# Patient Record
Sex: MALE | Race: BLACK OR AFRICAN AMERICAN | NOT HISPANIC OR LATINO | Employment: OTHER | URBAN - METROPOLITAN AREA
[De-identification: names, ages, dates, MRNs, and addresses within clinical notes are randomized per-mention and may not be internally consistent; named-entity substitution may affect disease eponyms.]

---

## 2021-06-16 ENCOUNTER — HOSPITAL ENCOUNTER (INPATIENT)
Facility: HOSPITAL | Age: 76
LOS: 2 days | Discharge: HOME | End: 2021-06-19
Attending: EMERGENCY MEDICINE | Admitting: HOSPITALIST

## 2021-06-16 ENCOUNTER — APPOINTMENT (OUTPATIENT)
Dept: GENERAL RADIOLOGY | Facility: HOSPITAL | Age: 76
End: 2021-06-16

## 2021-06-16 DIAGNOSIS — I16.0 HYPERTENSIVE URGENCY: Primary | ICD-10-CM

## 2021-06-16 DIAGNOSIS — N17.9 AKI (ACUTE KIDNEY INJURY) (HCC): ICD-10-CM

## 2021-06-16 DIAGNOSIS — R51.9 NONINTRACTABLE EPISODIC HEADACHE, UNSPECIFIED HEADACHE TYPE: ICD-10-CM

## 2021-06-16 PROCEDURE — 84484 ASSAY OF TROPONIN QUANT: CPT | Performed by: PHYSICIAN ASSISTANT

## 2021-06-16 PROCEDURE — 99285 EMERGENCY DEPT VISIT HI MDM: CPT

## 2021-06-16 PROCEDURE — 85025 COMPLETE CBC W/AUTO DIFF WBC: CPT | Performed by: PHYSICIAN ASSISTANT

## 2021-06-16 PROCEDURE — 71045 X-RAY EXAM CHEST 1 VIEW: CPT

## 2021-06-16 PROCEDURE — 80053 COMPREHEN METABOLIC PANEL: CPT | Performed by: PHYSICIAN ASSISTANT

## 2021-06-16 PROCEDURE — 83880 ASSAY OF NATRIURETIC PEPTIDE: CPT | Performed by: PHYSICIAN ASSISTANT

## 2021-06-16 RX ORDER — HYDRALAZINE HYDROCHLORIDE 20 MG/ML
10 INJECTION INTRAMUSCULAR; INTRAVENOUS ONCE
Status: COMPLETED | OUTPATIENT
Start: 2021-06-16 | End: 2021-06-17

## 2021-06-16 RX ORDER — SODIUM CHLORIDE 0.9 % (FLUSH) 0.9 %
10 SYRINGE (ML) INJECTION AS NEEDED
Status: DISCONTINUED | OUTPATIENT
Start: 2021-06-16 | End: 2021-06-19 | Stop reason: HOSPADM

## 2021-06-17 ENCOUNTER — APPOINTMENT (OUTPATIENT)
Dept: CT IMAGING | Facility: HOSPITAL | Age: 76
End: 2021-06-17

## 2021-06-17 PROBLEM — R73.9 HYPERGLYCEMIA: Status: ACTIVE | Noted: 2021-06-17

## 2021-06-17 PROBLEM — R79.89 ELEVATED SERUM CREATININE: Status: ACTIVE | Noted: 2021-06-17

## 2021-06-17 PROBLEM — I16.0 HYPERTENSIVE URGENCY: Status: ACTIVE | Noted: 2021-06-17

## 2021-06-17 LAB
ALBUMIN SERPL-MCNC: 4.5 G/DL (ref 3.5–5.2)
ALBUMIN/GLOB SERPL: 1.5 G/DL
ALP SERPL-CCNC: 58 U/L (ref 39–117)
ALT SERPL W P-5'-P-CCNC: 28 U/L (ref 1–41)
AMPHET+METHAMPHET UR QL: NEGATIVE
AMPHETAMINES UR QL: NEGATIVE
ANION GAP SERPL CALCULATED.3IONS-SCNC: 10 MMOL/L (ref 5–15)
ANION GAP SERPL CALCULATED.3IONS-SCNC: 10 MMOL/L (ref 5–15)
AST SERPL-CCNC: 58 U/L (ref 1–40)
BARBITURATES UR QL SCN: NEGATIVE
BASOPHILS # BLD AUTO: 0.05 10*3/MM3 (ref 0–0.2)
BASOPHILS NFR BLD AUTO: 0.9 % (ref 0–1.5)
BENZODIAZ UR QL SCN: NEGATIVE
BILIRUB SERPL-MCNC: 0.6 MG/DL (ref 0–1.2)
BUN SERPL-MCNC: 19 MG/DL (ref 8–23)
BUN SERPL-MCNC: 22 MG/DL (ref 8–23)
BUN/CREAT SERPL: 14.9 (ref 7–25)
BUN/CREAT SERPL: 15.7 (ref 7–25)
BUPRENORPHINE SERPL-MCNC: NEGATIVE NG/ML
CALCIUM SPEC-SCNC: 10.4 MG/DL (ref 8.6–10.5)
CALCIUM SPEC-SCNC: 9.8 MG/DL (ref 8.6–10.5)
CANNABINOIDS SERPL QL: NEGATIVE
CHLORIDE SERPL-SCNC: 100 MMOL/L (ref 98–107)
CHLORIDE SERPL-SCNC: 101 MMOL/L (ref 98–107)
CO2 SERPL-SCNC: 26 MMOL/L (ref 22–29)
CO2 SERPL-SCNC: 29 MMOL/L (ref 22–29)
COCAINE UR QL: NEGATIVE
CREAT SERPL-MCNC: 1.21 MG/DL (ref 0.76–1.27)
CREAT SERPL-MCNC: 1.48 MG/DL (ref 0.76–1.27)
CREAT UR-MCNC: 142.3 MG/DL
DEPRECATED RDW RBC AUTO: 44.8 FL (ref 37–54)
DEPRECATED RDW RBC AUTO: 44.9 FL (ref 37–54)
EOSINOPHIL # BLD AUTO: 0.14 10*3/MM3 (ref 0–0.4)
EOSINOPHIL NFR BLD AUTO: 2.6 % (ref 0.3–6.2)
EOSINOPHIL SPEC QL MICRO: 0 % EOS/100 CELLS (ref 0–0)
ERYTHROCYTE [DISTWIDTH] IN BLOOD BY AUTOMATED COUNT: 14.7 % (ref 12.3–15.4)
ERYTHROCYTE [DISTWIDTH] IN BLOOD BY AUTOMATED COUNT: 14.8 % (ref 12.3–15.4)
FLUAV RNA RESP QL NAA+PROBE: NOT DETECTED
FLUBV RNA RESP QL NAA+PROBE: NOT DETECTED
GFR SERPL CREATININE-BSD FRML MDRD: 56 ML/MIN/1.73
GFR SERPL CREATININE-BSD FRML MDRD: 71 ML/MIN/1.73
GLOBULIN UR ELPH-MCNC: 3 GM/DL
GLUCOSE BLDC GLUCOMTR-MCNC: 101 MG/DL (ref 70–130)
GLUCOSE BLDC GLUCOMTR-MCNC: 103 MG/DL (ref 70–130)
GLUCOSE BLDC GLUCOMTR-MCNC: 112 MG/DL (ref 70–130)
GLUCOSE SERPL-MCNC: 130 MG/DL (ref 65–99)
GLUCOSE SERPL-MCNC: 131 MG/DL (ref 65–99)
HBA1C MFR BLD: 7.5 % (ref 4.8–5.6)
HCT VFR BLD AUTO: 42.8 % (ref 37.5–51)
HCT VFR BLD AUTO: 43.4 % (ref 37.5–51)
HGB BLD-MCNC: 13.6 G/DL (ref 13–17.7)
HGB BLD-MCNC: 13.7 G/DL (ref 13–17.7)
IMM GRANULOCYTES # BLD AUTO: 0 10*3/MM3 (ref 0–0.05)
IMM GRANULOCYTES NFR BLD AUTO: 0 % (ref 0–0.5)
LYMPHOCYTES # BLD AUTO: 2.49 10*3/MM3 (ref 0.7–3.1)
LYMPHOCYTES NFR BLD AUTO: 45.8 % (ref 19.6–45.3)
MCH RBC QN AUTO: 26.5 PG (ref 26.6–33)
MCH RBC QN AUTO: 26.6 PG (ref 26.6–33)
MCHC RBC AUTO-ENTMCNC: 31.6 G/DL (ref 31.5–35.7)
MCHC RBC AUTO-ENTMCNC: 31.8 G/DL (ref 31.5–35.7)
MCV RBC AUTO: 83.3 FL (ref 79–97)
MCV RBC AUTO: 84.3 FL (ref 79–97)
METHADONE UR QL SCN: NEGATIVE
MONOCYTES # BLD AUTO: 0.42 10*3/MM3 (ref 0.1–0.9)
MONOCYTES NFR BLD AUTO: 7.7 % (ref 5–12)
NEUTROPHILS NFR BLD AUTO: 2.34 10*3/MM3 (ref 1.7–7)
NEUTROPHILS NFR BLD AUTO: 43 % (ref 42.7–76)
NRBC BLD AUTO-RTO: 0 /100 WBC (ref 0–0.2)
NT-PROBNP SERPL-MCNC: 75.9 PG/ML (ref 0–900)
OPIATES UR QL: NEGATIVE
OSMOLALITY UR: 789 MOSM/KG (ref 300–1100)
OXYCODONE UR QL SCN: NEGATIVE
PCP UR QL SCN: NEGATIVE
PLATELET # BLD AUTO: 241 10*3/MM3 (ref 140–450)
PLATELET # BLD AUTO: 245 10*3/MM3 (ref 140–450)
PMV BLD AUTO: 10 FL (ref 6–12)
PMV BLD AUTO: 10 FL (ref 6–12)
POTASSIUM SERPL-SCNC: 3.9 MMOL/L (ref 3.5–5.2)
POTASSIUM SERPL-SCNC: 4.2 MMOL/L (ref 3.5–5.2)
PROPOXYPH UR QL: NEGATIVE
PROT SERPL-MCNC: 7.5 G/DL (ref 6–8.5)
PROT UR-MCNC: 16.7 MG/DL
RBC # BLD AUTO: 5.14 10*6/MM3 (ref 4.14–5.8)
RBC # BLD AUTO: 5.15 10*6/MM3 (ref 4.14–5.8)
SARS-COV-2 RNA RESP QL NAA+PROBE: NOT DETECTED
SODIUM SERPL-SCNC: 136 MMOL/L (ref 136–145)
SODIUM SERPL-SCNC: 140 MMOL/L (ref 136–145)
SODIUM UR-SCNC: 162 MMOL/L
TRICYCLICS UR QL SCN: NEGATIVE
TROPONIN T SERPL-MCNC: <0.01 NG/ML (ref 0–0.03)
WBC # BLD AUTO: 4.09 10*3/MM3 (ref 3.4–10.8)
WBC # BLD AUTO: 5.44 10*3/MM3 (ref 3.4–10.8)

## 2021-06-17 PROCEDURE — 84156 ASSAY OF PROTEIN URINE: CPT | Performed by: NURSE PRACTITIONER

## 2021-06-17 PROCEDURE — 82570 ASSAY OF URINE CREATININE: CPT | Performed by: NURSE PRACTITIONER

## 2021-06-17 PROCEDURE — 70496 CT ANGIOGRAPHY HEAD: CPT

## 2021-06-17 PROCEDURE — 83036 HEMOGLOBIN GLYCOSYLATED A1C: CPT | Performed by: NURSE PRACTITIONER

## 2021-06-17 PROCEDURE — 87205 SMEAR GRAM STAIN: CPT | Performed by: NURSE PRACTITIONER

## 2021-06-17 PROCEDURE — 84300 ASSAY OF URINE SODIUM: CPT | Performed by: NURSE PRACTITIONER

## 2021-06-17 PROCEDURE — 85027 COMPLETE CBC AUTOMATED: CPT | Performed by: NURSE PRACTITIONER

## 2021-06-17 PROCEDURE — 99222 1ST HOSP IP/OBS MODERATE 55: CPT | Performed by: PSYCHIATRY & NEUROLOGY

## 2021-06-17 PROCEDURE — 70450 CT HEAD/BRAIN W/O DYE: CPT

## 2021-06-17 PROCEDURE — 82962 GLUCOSE BLOOD TEST: CPT

## 2021-06-17 PROCEDURE — 80048 BASIC METABOLIC PNL TOTAL CA: CPT | Performed by: NURSE PRACTITIONER

## 2021-06-17 PROCEDURE — 86335 IMMUNFIX E-PHORSIS/URINE/CSF: CPT | Performed by: NURSE PRACTITIONER

## 2021-06-17 PROCEDURE — 99223 1ST HOSP IP/OBS HIGH 75: CPT | Performed by: INTERNAL MEDICINE

## 2021-06-17 PROCEDURE — 80306 DRUG TEST PRSMV INSTRMNT: CPT | Performed by: INTERNAL MEDICINE

## 2021-06-17 PROCEDURE — 87636 SARSCOV2 & INF A&B AMP PRB: CPT | Performed by: INTERNAL MEDICINE

## 2021-06-17 PROCEDURE — 83935 ASSAY OF URINE OSMOLALITY: CPT | Performed by: NURSE PRACTITIONER

## 2021-06-17 PROCEDURE — 25010000002 HYDRALAZINE PER 20 MG: Performed by: PHYSICIAN ASSISTANT

## 2021-06-17 PROCEDURE — 71275 CT ANGIOGRAPHY CHEST: CPT

## 2021-06-17 PROCEDURE — 0 IOPAMIDOL PER 1 ML: Performed by: EMERGENCY MEDICINE

## 2021-06-17 PROCEDURE — 70498 CT ANGIOGRAPHY NECK: CPT

## 2021-06-17 RX ORDER — CLOPIDOGREL BISULFATE 75 MG/1
75 TABLET ORAL DAILY
Status: DISCONTINUED | OUTPATIENT
Start: 2021-06-17 | End: 2021-06-19 | Stop reason: HOSPADM

## 2021-06-17 RX ORDER — CHOLECALCIFEROL (VITAMIN D3) 125 MCG
5 CAPSULE ORAL NIGHTLY PRN
Status: DISCONTINUED | OUTPATIENT
Start: 2021-06-17 | End: 2021-06-19 | Stop reason: HOSPADM

## 2021-06-17 RX ORDER — NITROGLYCERIN 20 MG/100ML
5-200 INJECTION INTRAVENOUS
Status: DISCONTINUED | OUTPATIENT
Start: 2021-06-17 | End: 2021-06-18

## 2021-06-17 RX ORDER — ACETAMINOPHEN 325 MG/1
650 TABLET ORAL EVERY 4 HOURS PRN
Status: DISCONTINUED | OUTPATIENT
Start: 2021-06-17 | End: 2021-06-19 | Stop reason: HOSPADM

## 2021-06-17 RX ORDER — NICOTINE POLACRILEX 4 MG
15 LOZENGE BUCCAL
Status: DISCONTINUED | OUTPATIENT
Start: 2021-06-17 | End: 2021-06-19 | Stop reason: HOSPADM

## 2021-06-17 RX ORDER — ACETAMINOPHEN 650 MG/1
650 SUPPOSITORY RECTAL EVERY 4 HOURS PRN
Status: DISCONTINUED | OUTPATIENT
Start: 2021-06-17 | End: 2021-06-19 | Stop reason: HOSPADM

## 2021-06-17 RX ORDER — ASPIRIN 81 MG/1
81 TABLET ORAL DAILY
Status: DISCONTINUED | OUTPATIENT
Start: 2021-06-17 | End: 2021-06-19 | Stop reason: HOSPADM

## 2021-06-17 RX ORDER — ACETAMINOPHEN 325 MG/1
650 TABLET ORAL EVERY 6 HOURS PRN
COMMUNITY
End: 2021-06-24

## 2021-06-17 RX ORDER — LABETALOL HYDROCHLORIDE 5 MG/ML
10 INJECTION, SOLUTION INTRAVENOUS ONCE
Status: COMPLETED | OUTPATIENT
Start: 2021-06-17 | End: 2021-06-17

## 2021-06-17 RX ORDER — CARVEDILOL 3.12 MG/1
3.12 TABLET ORAL 2 TIMES DAILY WITH MEALS
Status: DISCONTINUED | OUTPATIENT
Start: 2021-06-17 | End: 2021-06-18

## 2021-06-17 RX ORDER — DEXTROSE MONOHYDRATE 25 G/50ML
25 INJECTION, SOLUTION INTRAVENOUS
Status: DISCONTINUED | OUTPATIENT
Start: 2021-06-17 | End: 2021-06-19 | Stop reason: HOSPADM

## 2021-06-17 RX ORDER — SODIUM CHLORIDE 0.9 % (FLUSH) 0.9 %
10 SYRINGE (ML) INJECTION AS NEEDED
Status: DISCONTINUED | OUTPATIENT
Start: 2021-06-17 | End: 2021-06-19 | Stop reason: HOSPADM

## 2021-06-17 RX ORDER — SODIUM CHLORIDE 0.9 % (FLUSH) 0.9 %
10 SYRINGE (ML) INJECTION EVERY 12 HOURS SCHEDULED
Status: DISCONTINUED | OUTPATIENT
Start: 2021-06-17 | End: 2021-06-19 | Stop reason: HOSPADM

## 2021-06-17 RX ORDER — ACETAMINOPHEN 160 MG/5ML
650 SOLUTION ORAL EVERY 4 HOURS PRN
Status: DISCONTINUED | OUTPATIENT
Start: 2021-06-17 | End: 2021-06-19 | Stop reason: HOSPADM

## 2021-06-17 RX ORDER — ATORVASTATIN CALCIUM 40 MG/1
80 TABLET, FILM COATED ORAL NIGHTLY
Status: DISCONTINUED | OUTPATIENT
Start: 2021-06-17 | End: 2021-06-19 | Stop reason: HOSPADM

## 2021-06-17 RX ADMIN — SODIUM CHLORIDE, PRESERVATIVE FREE 10 ML: 5 INJECTION INTRAVENOUS at 21:30

## 2021-06-17 RX ADMIN — CARVEDILOL 3.12 MG: 3.12 TABLET, FILM COATED ORAL at 16:01

## 2021-06-17 RX ADMIN — HYDRALAZINE HYDROCHLORIDE 10 MG: 20 INJECTION INTRAMUSCULAR; INTRAVENOUS at 00:30

## 2021-06-17 RX ADMIN — IOPAMIDOL 150 ML: 755 INJECTION, SOLUTION INTRAVENOUS at 02:48

## 2021-06-17 RX ADMIN — NICARDIPINE HYDROCHLORIDE 5 MG/HR: 0.1 INJECTION, SOLUTION INTRAVENOUS at 01:46

## 2021-06-17 RX ADMIN — SODIUM CHLORIDE, PRESERVATIVE FREE 10 ML: 5 INJECTION INTRAVENOUS at 18:28

## 2021-06-17 RX ADMIN — ASPIRIN 81 MG: 81 TABLET, COATED ORAL at 16:01

## 2021-06-17 RX ADMIN — LABETALOL 20 MG/4 ML (5 MG/ML) INTRAVENOUS SYRINGE 10 MG: at 20:25

## 2021-06-17 RX ADMIN — NICARDIPINE HYDROCHLORIDE 2.5 MG/HR: 0.1 INJECTION, SOLUTION INTRAVENOUS at 03:26

## 2021-06-17 RX ADMIN — CLOPIDOGREL BISULFATE 75 MG: 75 TABLET ORAL at 16:01

## 2021-06-17 RX ADMIN — ATORVASTATIN CALCIUM 80 MG: 40 TABLET, FILM COATED ORAL at 20:25

## 2021-06-17 NOTE — ED PROVIDER NOTES
Subjective   Patient is a 75-year-old male who presents emergency room today with complaints of a headache and ear pain.  Patient shares symptoms of feeling of warmth come over his head that travels down into his chest.  He also states that he has had a change in his vision that first started several months ago.  He shares that he feels that when he was at the [facility] in [place] he believes somebody implanted something in his ears.  He believes that this is caused him to have symptoms that he is experiencing.  He denies anything specific that makes his symptoms better or worse and shares no additional associated symptoms on exam.  Patient reports only medication he is taking is Tylenol as needed for pain.  He shares no significant past medical history.          Review of Systems   Constitutional: Negative for activity change, chills and fever.   HENT: Positive for ear pain. Negative for hearing loss.    Eyes: Positive for visual disturbance.   Respiratory: Negative for cough, chest tightness and shortness of breath.    Cardiovascular: Negative for chest pain.   Gastrointestinal: Negative.    Genitourinary: Negative.    Musculoskeletal: Negative.    Skin: Negative.    Neurological: Positive for headaches.   Psychiatric/Behavioral: Negative.        No past medical history on file.    No Known Allergies    No past surgical history on file.    No family history on file.    Social History     Socioeconomic History   • Marital status: Single     Spouse name: Not on file   • Number of children: Not on file   • Years of education: Not on file   • Highest education level: Not on file           Objective   Physical Exam  Vitals and nursing note reviewed.   Constitutional:       General: He is not in acute distress.     Appearance: Normal appearance. He is not ill-appearing or toxic-appearing.   HENT:      Head: Normocephalic and atraumatic.      Right Ear: Tympanic membrane and ear canal normal.      Left Ear:  Tympanic membrane and ear canal normal.      Nose: Nose normal.      Mouth/Throat:      Mouth: Mucous membranes are moist.   Eyes:      Extraocular Movements: Extraocular movements intact.      Conjunctiva/sclera: Conjunctivae normal.   Cardiovascular:      Rate and Rhythm: Normal rate and regular rhythm.      Pulses: Normal pulses.   Pulmonary:      Effort: Pulmonary effort is normal. No respiratory distress.   Abdominal:      Palpations: Abdomen is soft.      Tenderness: There is no abdominal tenderness.   Musculoskeletal:         General: Normal range of motion.      Cervical back: Normal range of motion.   Skin:     General: Skin is warm and dry.   Neurological:      General: No focal deficit present.      Mental Status: He is alert.   Psychiatric:         Mood and Affect: Mood normal.         Behavior: Behavior normal.         Thought Content: Thought content normal.         Judgment: Judgment normal.         Critical Care  Performed by: Gt Fields MD  Authorized by: Gt Fields MD     Critical care provider statement:     Critical care time (minutes):  35    Critical care was necessary to treat or prevent imminent or life-threatening deterioration of the following conditions: hypertensive emergency/urgency.    Critical care was time spent personally by me on the following activities:  Development of treatment plan with patient or surrogate, evaluation of patient's response to treatment, examination of patient, obtaining history from patient or surrogate, ordering and performing treatments and interventions, ordering and review of laboratory studies, ordering and review of radiographic studies, pulse oximetry, re-evaluation of patient's condition and review of old charts               ED Course  ED Course as of Jun 17 0302   u Jun 17, 2021   0149 Hospitalist paged for admission    [JG]   0258 Patient presents to the emergency room for evaluation of a headache with intermittent vision  changes that he has been experiencing over approximately the last month.  No acute or emergent findings demonstrated on physical exam.  Patient neurologically intact.  Patient found to be hypertensive initially with blood pressure reading of 215/122 which elevated to 248/135 while in the emergency department.  Patient received initial dose of hydralazine and responded well with blood pressure following treatment of 194/112.  No additional acute or emergent findings demonstrated on labs.  Negative troponin and proBNP within normal limits.  Serum creatinine of 1.48 which could represent acute kidney injury but no prior baseline for comparison.  Chest x-ray without acute cardiopulmonary process and negative CT scan of the head.  Case reviewed with Dr. Fields who recommended admission and initiation of Cardene for hypertensive urgency.  Hospitalist paged for admission agreeable to accept patient.  Patient agreeable to plan of care and hospital admission.  At time of admission disposition patient was resting in a position of comfort, no acute distress, vital signs stable, initial Cardene initiated and patient saturating 98% on room air.    [JG]      ED Course User Index  [JG] Clifford Thomas, PA      Recent Results (from the past 24 hour(s))   Comprehensive Metabolic Panel    Collection Time: 06/16/21 11:46 PM    Specimen: Blood   Result Value Ref Range    Glucose 131 (H) 65 - 99 mg/dL    BUN 22 8 - 23 mg/dL    Creatinine 1.48 (H) 0.76 - 1.27 mg/dL    Sodium 140 136 - 145 mmol/L    Potassium 3.9 3.5 - 5.2 mmol/L    Chloride 101 98 - 107 mmol/L    CO2 29.0 22.0 - 29.0 mmol/L    Calcium 10.4 8.6 - 10.5 mg/dL    Total Protein 7.5 6.0 - 8.5 g/dL    Albumin 4.50 3.50 - 5.20 g/dL    ALT (SGPT) 28 1 - 41 U/L    AST (SGOT) 58 (H) 1 - 40 U/L    Alkaline Phosphatase 58 39 - 117 U/L    Total Bilirubin 0.6 0.0 - 1.2 mg/dL    eGFR  African Amer 56 (L) >60 mL/min/1.73    Globulin 3.0 gm/dL    A/G Ratio 1.5 g/dL    BUN/Creatinine Ratio  14.9 7.0 - 25.0    Anion Gap 10.0 5.0 - 15.0 mmol/L   Troponin    Collection Time: 06/16/21 11:46 PM    Specimen: Blood   Result Value Ref Range    Troponin T <0.010 0.000 - 0.030 ng/mL   BNP    Collection Time: 06/16/21 11:46 PM    Specimen: Blood   Result Value Ref Range    proBNP 75.9 0.0 - 900.0 pg/mL   CBC Auto Differential    Collection Time: 06/16/21 11:46 PM    Specimen: Blood   Result Value Ref Range    WBC 5.44 3.40 - 10.80 10*3/mm3    RBC 5.14 4.14 - 5.80 10*6/mm3    Hemoglobin 13.6 13.0 - 17.7 g/dL    Hematocrit 42.8 37.5 - 51.0 %    MCV 83.3 79.0 - 97.0 fL    MCH 26.5 (L) 26.6 - 33.0 pg    MCHC 31.8 31.5 - 35.7 g/dL    RDW 14.8 12.3 - 15.4 %    RDW-SD 44.8 37.0 - 54.0 fl    MPV 10.0 6.0 - 12.0 fL    Platelets 245 140 - 450 10*3/mm3    Neutrophil % 43.0 42.7 - 76.0 %    Lymphocyte % 45.8 (H) 19.6 - 45.3 %    Monocyte % 7.7 5.0 - 12.0 %    Eosinophil % 2.6 0.3 - 6.2 %    Basophil % 0.9 0.0 - 1.5 %    Immature Grans % 0.0 0.0 - 0.5 %    Neutrophils, Absolute 2.34 1.70 - 7.00 10*3/mm3    Lymphocytes, Absolute 2.49 0.70 - 3.10 10*3/mm3    Monocytes, Absolute 0.42 0.10 - 0.90 10*3/mm3    Eosinophils, Absolute 0.14 0.00 - 0.40 10*3/mm3    Basophils, Absolute 0.05 0.00 - 0.20 10*3/mm3    Immature Grans, Absolute 0.00 0.00 - 0.05 10*3/mm3    nRBC 0.0 0.0 - 0.2 /100 WBC   Urine Drug Screen - Urine, Clean Catch    Collection Time: 06/17/21  2:31 AM    Specimen: Urine, Clean Catch   Result Value Ref Range    THC, Screen, Urine Negative Negative    Phencyclidine (PCP), Urine Negative Negative    Cocaine Screen, Urine Negative Negative    Methamphetamine, Ur Negative Negative    Opiate Screen Negative Negative    Amphetamine Screen, Urine Negative Negative    Benzodiazepine Screen, Urine Negative Negative    Tricyclic Antidepressants Screen Negative Negative    Methadone Screen, Urine Negative Negative    Barbiturates Screen, Urine Negative Negative    Oxycodone Screen, Urine Negative Negative    Propoxyphene Screen  Negative Negative    Buprenorphine, Screen, Urine Negative Negative     Note: In addition to lab results from this visit, the labs listed above may include labs taken at another facility or during a different encounter within the last 24 hours. Please correlate lab times with ED admission and discharge times for further clarification of the services performed during this visit.    CT Head Without Contrast   Final Result   Senescent changes without acute abnormality.      Signer Name: Osmar Johnson MD    Signed: 6/17/2021 12:58 AM    Workstation Name: [facility]     Radiology [facility]      XR Chest 1 View   Final Result   No acute cardiopulmonary findings.      Signer Name: Osmar Johnson MD    Signed: 6/17/2021 12:00 AM    Workstation Name: [facility]      CT Angiogram Head    (Results Pending)   CT Angiogram Neck    (Results Pending)   CT Angiogram Chest With & Without Contrast    (Results Pending)     Vitals:    06/17/21 0100 06/17/21 0145 06/17/21 0200 06/17/21 0215   BP: (!) 208/100 (!) 194/112 167/99 159/87   BP Location:  Right arm     Patient Position:  Sitting     Pulse:  87 89 92   Resp:  16     Temp:       TempSrc:       SpO2:  98% 97% 100%   Weight:       Height:         Medications   sodium chloride 0.9 % flush 10 mL (has no administration in time range)   niCARdipine (CARDENE) 20 mg in 200 mL NS infusion (0 mg/hr Intravenous Hold 6/17/21 0231)   hydrALAZINE (APRESOLINE) injection 10 mg (10 mg Intravenous Given 6/17/21 0030)   iopamidol (ISOVUE-370) 76 % injection 150 mL (150 mL Intravenous Given 6/17/21 0248)     ECG/EMG Results (last 24 hours)     ** No results found for the last 24 hours. **        No orders to display                                          MDM  Number of Diagnoses or Management Options  VIVIAN (acute kidney injury) (CMS/HCC): new and requires workup  Hypertensive urgency: new and requires workup  Nonintractable episodic  headache, unspecified headache type: new and requires workup  Risk of Complications, Morbidity, and/or Mortality  Presenting problems: high  Diagnostic procedures: moderate  Management options: moderate    Patient Progress  Patient progress: stable      Final diagnoses:   Hypertensive urgency   Nonintractable episodic headache, unspecified headache type   VIVIAN (acute kidney injury) (CMS/[facility])       ED Disposition  ED Disposition     ED Disposition Condition Comment    Decision to Admit  Level of Care: Telemetry [5]   Diagnosis: Hypertensive urgency [233110]   Admitting Physician: JAKE ROBERTS [[id number]]   Attending Physician: JAKE ROBERTS [[id number]]   Bed Request Comments: reg            No follow-up provider specified.       Medication List      No changes were made to your prescriptions during this visit.          Clifford Thomas PA  06/17/21 0302       Gt Fields MD  06/17/21 [id number]

## 2021-06-17 NOTE — PROGRESS NOTES
[facility]  ADMISSION FOLLOW-UP NOTE          Patient admitted after midnight, H&P by my partner performed earlier on today's date reviewed.  Interim findings, labs, and charting also reviewed.        The [facility] Hospital Problem List has been managed and updated to include any new diagnoses:  Active Hospital Problems    Diagnosis  POA   • **Hypertensive urgency [I16.0]  Yes   • Elevated serum creatinine [R79.89]  Yes   • Hyperglycemia [R73.9]  Yes      Resolved Hospital Problems   No resolved problems to display.       Mr. French is a 76 yo with no known past medical history who presented to the ER with complaints of headache and ear pain found to have hypertensive urgency.      ADDITIONAL PLAN:      HTN urgency  --continue cardene gtt, start oral meds today  --PRN Tylenol for headache  -- CTA H&N done- cannot rule out dissection of R vertebral artery as no prior studies.  Will consult Neurology for further advice as to imaging, etc.  May ultimately need Neurosurgery.   --CT head unremarkable  --CT chest also unremarkable    New diagnosis T2DM  --HbA1C 7.5%, will need oral hypoglycemics at d/c  --SSI for now    Elevated creatinine  --creatinine 1.48 on admission, baseline unknown  -- better this am at 1.21  --bmp in the am    Kori Panchal MD  06/17/21

## 2021-06-17 NOTE — CONSULTS
"After obtaining permission, seen Mr. French for diabetes education.  We reviewed his current A1c of 7.5 and discussed its significance.  Specifically discussed risk of stroke and MI associated with elevated A1c and BP.  Mr. French states he has been told in the past he was \"borderline\" .  We discussed diagnostic criteria for T2DM.  We reviewed glucose and A1c goals per ADA and he was given handouts.  Mr. French would like to follow up with a PCP before starting to check blood sugars.  I agree. Considering age and A1c this would be appropriate. He was encouraged to see PCP as soon as possible and have A1c redrawn in 3 months.  We discussed the possibility of being discharged with Metformin or other oral diabetes meds.  We reviewed possible side effects.  Mr French was encouraged to see PCP regularly, obtain yearly dilated eye exams, and check feet daily. He verbalized understanding. At this time, I feel Mr. French is overwhelmed. Education was limited.  We will call in a few weeks and offer outpatient education. Thank you.   "

## 2021-06-17 NOTE — CONSULTS
Stroke Consult Note    Patient Name: Kelvin French   MRN: [id number]  Age: 75 y.o.  Sex: male  : 1945    Primary Care Physician: Provider, No Known  Referring Physician:  No ref. provider found    Handedness: Right  Race:       Chief Complaint/Reason for Consultation: lightheadedness x 1 week, right vertebral artery abnormalities on CTA.     Subjective      HPI: Mr. French is a 75 year old male with no known medical diagnosis, who does not regularly see a physician, who presented to the Emergency Department this morning with complaints of headache and ear pain x 2 weeks and lightheadedness for approximately 1 week.  He reports that his lightheadedness has been intermittent, therefore he delayed initial evaluation of his symptoms however when they became constant he came to the emergency department for further evaluation.  Upon arrival to the ED he was noted to be extremely hypertensive with a blood pressure of 248/135.  He was started on a Cardene drip and admitted to the hospital service for further evaluation.  Advanced cerebral imaging was obtained, including a CTA head and neck, which was concerning for a questionable right vertebral artery dissection therefore a stroke neurology was consulted.    The patient states that he has not seen a physician in many years and does not take any prescription medications.  He denies experiencing any unilateral weakness, numbness, gait disturbance, dysarthria, or expressive aphasia.    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  He has been told that he has glaucoma, but he has never seen ophthalmology.    Last Known Normal Date/Time: 1 week ago    Review of Systems   Constitutional: Negative.    HENT: Positive for ear pain.    Eyes: Positive for visual disturbance (Chronic left eye blindness and decreased vision in right eye). Negative for photophobia.   Respiratory:  Negative.    Cardiovascular: Negative.    Gastrointestinal: Negative.    Endocrine: Negative.    Genitourinary: Negative.    Musculoskeletal: Positive for arthralgias. Negative for gait problem.   Skin: Negative.    Neurological: Positive for light-headedness and headaches. Negative for facial asymmetry, speech difficulty, weakness and numbness.   Hematological: Negative.    Psychiatric/Behavioral: Negative.       Past Medical History:   Diagnosis Date   • Peptic ulceration      No past surgical history on file.  No family history on file.  Social History     Socioeconomic History   • Marital status: Single     Spouse name: Not on file   • Number of children: Not on file   • Years of education: Not on file   • Highest education level: Not on file   Tobacco Use   • Smoking status: Former Smoker   • Smokeless tobacco: Never Used   • Tobacco comment: quit in the 1980's   Substance and Sexual Activity   • Alcohol use: Not Currently     Comment: quit 5 years ago   • Drug use: Never   • Sexual activity: Defer     No Known Allergies  Prior to Admission medications    Medication Sig Start Date End Date Taking? Authorizing Provider   acetaminophen (TYLENOL) 325 MG tablet Take 650 mg by mouth Every 6 (Six) Hours As Needed for Mild Pain .   Yes Provider, MD Memo         Objective     Temp:  [97.6 °F (36.4 °C)-98.4 °F (36.9 °C)] 97.6 °F (36.4 °C)  Heart Rate:  [80-96] 89  Resp:  [14-16] 15  BP: (141-248)/() 181/111     Neurological Exam  Mental Status  Awake, alert and oriented to person, place and time.Alert. Oriented to person, place, time and situation. Recent and remote memory are intact. Speech is normal. Language is fluent with no aphasia. Attention and concentration are normal. Fund of knowledge is appropriate for level of education.    Cranial Nerves  CN II: Left visual acuity: no light perception. Chronic blindness x 5 years. In the right eye he is unable to see on the right half of the vision..  CN III,  IV, VI: Extraocular movements intact bilaterally. Pupils equal round and reactive to light bilaterally.  CN V: Facial sensation is normal.  CN VII: Full and symmetric facial movement.  CN VIII: Hearing is intact.  CN IX, X: Palate elevates symmetrically  CN XI: Shoulder shrug strength is normal.  CN XII: Tongue midline without atrophy or fasciculations.    Motor  Normal muscle bulk throughout. No fasciculations present. Normal muscle tone. Strength is 5/5 throughout all four extremities.    Sensory  Light touch is normal in upper and lower extremities.     Reflexes  Deep tendon reflexes are 2+ and symmetric in all four extremities with downgoing toes bilaterally.    Coordination  Finger-to-nose, rapid alternating movements and heel-to-shin normal bilaterally without dysmetria.    Gait  Not assessed.      Physical Exam  Vitals and nursing note reviewed.   Constitutional:       General: He is not in acute distress.     Appearance: Normal appearance. He is not toxic-appearing.   HENT:      Head: Normocephalic and atraumatic.      Mouth/Throat:      Mouth: Mucous membranes are moist.      Pharynx: Oropharynx is clear.   Eyes:      Extraocular Movements: Extraocular movements intact.      Pupils: Pupils are equal, round, and reactive to light.   Cardiovascular:      Rate and Rhythm: Normal rate and regular rhythm.      Comments: Occasional PACs  Pulmonary:      Effort: Pulmonary effort is normal.      Breath sounds: Normal breath sounds.   Abdominal:      General: There is no distension.   Musculoskeletal:         General: Normal range of motion.      Cervical back: Normal range of motion.   Skin:     General: Skin is warm and dry.   Neurological:      General: No focal deficit present.      Mental Status: He is alert and oriented to person, place, and time.      Coordination: Coordination is intact.      Deep Tendon Reflexes: Strength normal and reflexes are normal and symmetric.   Psychiatric:         Mood and Affect:  Mood normal.         Speech: Speech normal.         Behavior: Behavior normal.       Acute Stroke Data    Alteplase (tPA) Inclusion / Exclusion Criteria    Time: 12:02 EDT  Person Administering Scale: Юлия Duran RN Extender  Inclusion Criteria  [x]   18 years of age or greater   []   Onset of symptoms < 4.5 hours before beginning treatment (stroke onset = time patient was last seen well or without symptoms).   []   Diagnosis of acute ischemic stroke causing measurable disabling deficit (Complete Hemianopia, Any Aphasia, Visual or Sensory Extinction, Any weakness limiting sustained effort against gravity)   []   Any remaining deficit considered potentially disabling in view of patient and practitioner   Exclusion criteria (Do not proceed with Alteplase if any are checked under exclusion criteria)  [x]   Onset unknown or GREATER than 4.5 hours   []   ICH on CT/MRI   []   CT demonstrates hypodensity representing acute or subacute infarct   []   Significant head trauma or prior stroke in the previous 3 months   []   Symptoms suggestive of subarachnoid hemorrhage   []   History of un-ruptured intracranial aneurysm GREATER than 10 mm   []   Recent intracranial or intraspinal surgery within the last 3 months   []   Arterial puncture at a non-compressible site in the previous 7 days   []   Active internal bleeding   []   Acute bleeding tendency   []   Platelet count LESS than 100,000 for known hematological diseases such as leukemia, thrombocytopenia or chronic cirrhosis   []   Current use of anticoagulant with INR GREATER than 1.7 or PT GREATER than 15 seconds, aPTT GREATER than 40 seconds   []   Heparin received within 48 hours, resulting in abnormally elevated aPTT GREATER than upper limit of normal   []   Current use of direct thrombin inhibitors or direct factor Xa inhibitors in the past 48 hours   []   Elevated blood pressure refractory to treatment (systolic GREATER than 185 mm/Hg or diastolic  GREATER  than 110 mm/Hg   []   Suspected infective endocarditis and aortic arch dissection   []   Current use of therapeutic treatment dose of low-molecular-weight heparin (LMWH) within the previous 24 hours   []   Structural GI malignancy or bleed   Relative exclusion for all patients  []   Only minor non-disabling symptoms   []   Pregnancy   []   Seizure at onset with postictal residual neurological impairments   []   Major surgery or previous trauma within past 14 days   []   History of previous spontaneous ICH, intracranial neoplasm, or AV malformation   []   Postpartum (within previous 14 days)   []   Recent GI or urinary tract hemorrhage (within previous 21 days)   []   Recent acute MI (within previous 3 months)   []   History of un-ruptured intracranial aneurysm LESS than 10 mm   []   History of ruptured intracranial aneurysm   []   Blood glucose LESS than 50 mg/dL (2.7 mmol/L)   []   Dural puncture within the last 7 days   []   Known GREATER than 10 cerebral microbleeds   Additional exclusions for patients with symptoms onset between 3 and 4.5 hours.  []   Age > 80.   []   On any anticoagulants regardless of INR  >>> Warfarin (Coumadin), Heparin, Enoxaparin (Lovenox), fondaparinux (Arixtra), bivalirudin (Angiomax), Argatroban, dabigatran (Pradaxa), rivaroxaban (Xarelto), or apixaban (Eliquis)   []   Severe stroke (NIHSS > 25).   []   History of BOTH diabetes and previous ischemic stroke.   []   The risks and benefits have been discussed with the patient or family related to the administration of IV Alteplase for stroke symptoms.   []   I have discussed and reviewed the patient's case and imaging with the attending prior to IV Alteplase.   N/A Time Alteplase administered       Hospital Meds:  Scheduled- aspirin, 81 mg, Oral, Daily  atorvastatin, 80 mg, Oral, Nightly  carvedilol, 3.125 mg, Oral, BID With Meals  clopidogrel, 75 mg, Oral, Daily  insulin lispro, 0-7 Units, Subcutaneous, TID AC  sodium chloride, 10 mL,  Intravenous, Q12H      Infusions- nitroglycerin, 5-200 mcg/min, Last Rate: Stopped (21 0402)       PRNs- •  acetaminophen **OR** acetaminophen **OR** acetaminophen  •  dextrose  •  dextrose  •  glucagon (human recombinant)  •  melatonin  •  [COMPLETED] Insert peripheral IV **AND** sodium chloride  •  sodium chloride    Functional Status Prior to Current Stroke/[place] Score: 0    NIH Stroke Scale  Time: 12:02 EDT  Person Administering Scale: Юлия Duran RN    1a  Level of consciousness: 0=alert; keenly responsive   1b. LOC questions:  0=Performs both tasks correctly   1c. LOC commands: 0=Performs both tasks correctly   2.  Best Gaze: 0=normal   3.  Visual: 3=Bilateral hemianopia (blind including cortical blindness) - chronic   4. Facial Palsy: 0=Normal symmetric movement   5a.  Motor left arm: 0=No drift, limb holds 90 (or 45) degrees for full 10 seconds   5b.  Motor right arm: 0=No drift, limb holds 90 (or 45) degrees for full 10 seconds   6a. motor left le=No drift, limb holds 90 (or 45) degrees for full 10 seconds   6b  Motor right le=No drift, limb holds 90 (or 45) degrees for full 10 seconds   7. Limb Ataxia: 0=Absent   8.  Sensory: 0=Normal; no sensory loss   9. Best Language:  0=No aphasia, normal   10. Dysarthria: 0=Normal   11. Extinction and Inattention: 0=No abnormality    Total:   3 (baseline)       Results Reviewed:  I have personally reviewed current lab, radiology, and data    CT head shows no acute changes, no hemorrhage.  CTA head/neck - chronic right vertebral stenosis/atherosclerosis and bilateral intracranial and extracranial stenosis.  A1c 7.50  Creatinine 1.21, GFR 71; improved from yesterday  AST 58        Assessment/Plan:      1. Hypertensive urgency.  Patient symptoms of lightheadedness/dizziness, likely secondary to hypertension.  He presented with a blood pressure of 248/135.  Aggressive blood pressure control with goal SBP <140.  Hospitalist managing the  same.    2. Cerebral atherosclerosis.  CTA reveals diffusely atherosclerotic right vertebral artery along with bilateral intracranial and extracranial atherosclerosis.  Will initiate the patient on high intensity statin in addition to dual antiplatelet therapy (ASA 81 mg and Plavix 75 mg) x90 days then decrease to  mg monotherapy for secondary stroke prevention.      3. Type 2 diabetes, newly diagnosed.  A1c on admission is 7.50; goal <7.  Management per hospitalist.  Will have diabetes educator see the patient for new diagnosis.  Patient will need close blood glucose monitoring by PCP.     4. Activity as tolerated.      5. Heart healthy, diabetic diet.  Will have dietitian meet with patient to discuss new dietary recommendations.     6. Case management consult for discharge needs.  Patient will need assistance in establishing a primary care provider to manage his multiple comorbidities.    From a neurological standpoint the patient can be discharged when medically ready.  Stroke neurology will sign off at this time.  Please call for any questions or concerns.  Thank you for this consult.    Plan of care was discussed with the patient and the hospitalist.    Юлия Duran RN Extender/Stroke Navigator    I have personally seen, interviewed and examined the patient and verified all the key components of the history, physical examination, assessment and plan with Юлия Duran RN Extender and reviewed the note, which reflects my changes and contributions.    Malik Yoon MD  6/17/2021             Additional Progress Note...

## 2021-06-18 PROBLEM — E11.9 NEW ONSET TYPE 2 DIABETES MELLITUS (HCC): Status: ACTIVE | Noted: 2021-06-17

## 2021-06-18 PROBLEM — I67.2 CEREBRAL ATHEROSCLEROSIS: Status: ACTIVE | Noted: 2021-06-18

## 2021-06-18 LAB
ANION GAP SERPL CALCULATED.3IONS-SCNC: 8 MMOL/L (ref 5–15)
BUN SERPL-MCNC: 20 MG/DL (ref 8–23)
BUN/CREAT SERPL: 14.1 (ref 7–25)
CALCIUM SPEC-SCNC: 10.3 MG/DL (ref 8.6–10.5)
CHLORIDE SERPL-SCNC: 100 MMOL/L (ref 98–107)
CO2 SERPL-SCNC: 31 MMOL/L (ref 22–29)
CREAT SERPL-MCNC: 1.42 MG/DL (ref 0.76–1.27)
DEPRECATED RDW RBC AUTO: 45.8 FL (ref 37–54)
ERYTHROCYTE [DISTWIDTH] IN BLOOD BY AUTOMATED COUNT: 14.7 % (ref 12.3–15.4)
GFR SERPL CREATININE-BSD FRML MDRD: 59 ML/MIN/1.73
GLUCOSE BLDC GLUCOMTR-MCNC: 126 MG/DL (ref 70–130)
GLUCOSE BLDC GLUCOMTR-MCNC: 132 MG/DL (ref 70–130)
GLUCOSE BLDC GLUCOMTR-MCNC: 133 MG/DL (ref 70–130)
GLUCOSE BLDC GLUCOMTR-MCNC: 139 MG/DL (ref 70–130)
GLUCOSE SERPL-MCNC: 147 MG/DL (ref 65–99)
HCT VFR BLD AUTO: 43.5 % (ref 37.5–51)
HGB BLD-MCNC: 13.7 G/DL (ref 13–17.7)
MCH RBC QN AUTO: 26.8 PG (ref 26.6–33)
MCHC RBC AUTO-ENTMCNC: 31.5 G/DL (ref 31.5–35.7)
MCV RBC AUTO: 85.1 FL (ref 79–97)
PLATELET # BLD AUTO: 248 10*3/MM3 (ref 140–450)
PMV BLD AUTO: 10 FL (ref 6–12)
POTASSIUM SERPL-SCNC: 4.2 MMOL/L (ref 3.5–5.2)
RBC # BLD AUTO: 5.11 10*6/MM3 (ref 4.14–5.8)
SODIUM SERPL-SCNC: 139 MMOL/L (ref 136–145)
WBC # BLD AUTO: 4.29 10*3/MM3 (ref 3.4–10.8)

## 2021-06-18 PROCEDURE — 80048 BASIC METABOLIC PNL TOTAL CA: CPT | Performed by: INTERNAL MEDICINE

## 2021-06-18 PROCEDURE — 25010000002 HYDRALAZINE PER 20 MG: Performed by: HOSPITALIST

## 2021-06-18 PROCEDURE — 99233 SBSQ HOSP IP/OBS HIGH 50: CPT | Performed by: HOSPITALIST

## 2021-06-18 PROCEDURE — 85027 COMPLETE CBC AUTOMATED: CPT | Performed by: INTERNAL MEDICINE

## 2021-06-18 PROCEDURE — 82962 GLUCOSE BLOOD TEST: CPT

## 2021-06-18 RX ORDER — HYDRALAZINE HYDROCHLORIDE 20 MG/ML
10 INJECTION INTRAMUSCULAR; INTRAVENOUS EVERY 6 HOURS PRN
Status: DISCONTINUED | OUTPATIENT
Start: 2021-06-18 | End: 2021-06-19 | Stop reason: HOSPADM

## 2021-06-18 RX ORDER — FAMOTIDINE 20 MG/1
20 TABLET, FILM COATED ORAL 2 TIMES DAILY PRN
Status: DISCONTINUED | OUTPATIENT
Start: 2021-06-18 | End: 2021-06-19 | Stop reason: HOSPADM

## 2021-06-18 RX ORDER — ALUMINA, MAGNESIA, AND SIMETHICONE 2400; 2400; 240 MG/30ML; MG/30ML; MG/30ML
15 SUSPENSION ORAL EVERY 6 HOURS PRN
Status: DISCONTINUED | OUTPATIENT
Start: 2021-06-18 | End: 2021-06-19 | Stop reason: HOSPADM

## 2021-06-18 RX ORDER — CARVEDILOL 6.25 MG/1
6.25 TABLET ORAL ONCE
Status: COMPLETED | OUTPATIENT
Start: 2021-06-18 | End: 2021-06-18

## 2021-06-18 RX ORDER — AMLODIPINE BESYLATE 5 MG/1
10 TABLET ORAL
Status: DISCONTINUED | OUTPATIENT
Start: 2021-06-18 | End: 2021-06-19 | Stop reason: HOSPADM

## 2021-06-18 RX ORDER — CARVEDILOL 12.5 MG/1
12.5 TABLET ORAL EVERY 12 HOURS SCHEDULED
Status: DISCONTINUED | OUTPATIENT
Start: 2021-06-18 | End: 2021-06-19

## 2021-06-18 RX ORDER — NITROGLYCERIN 20 MG/100ML
5-200 INJECTION INTRAVENOUS
Status: DISCONTINUED | OUTPATIENT
Start: 2021-06-18 | End: 2021-06-18

## 2021-06-18 RX ORDER — CARVEDILOL 6.25 MG/1
6.25 TABLET ORAL 2 TIMES DAILY WITH MEALS
Status: DISCONTINUED | OUTPATIENT
Start: 2021-06-18 | End: 2021-06-18

## 2021-06-18 RX ADMIN — CARVEDILOL 6.25 MG: 6.25 TABLET, FILM COATED ORAL at 08:07

## 2021-06-18 RX ADMIN — CARVEDILOL 12.5 MG: 12.5 TABLET, FILM COATED ORAL at 20:14

## 2021-06-18 RX ADMIN — CARVEDILOL 6.25 MG: 6.25 TABLET, FILM COATED ORAL at 13:37

## 2021-06-18 RX ADMIN — CLOPIDOGREL BISULFATE 75 MG: 75 TABLET ORAL at 08:06

## 2021-06-18 RX ADMIN — SODIUM CHLORIDE, PRESERVATIVE FREE 10 ML: 5 INJECTION INTRAVENOUS at 08:08

## 2021-06-18 RX ADMIN — ATORVASTATIN CALCIUM 80 MG: 40 TABLET, FILM COATED ORAL at 20:14

## 2021-06-18 RX ADMIN — FAMOTIDINE 20 MG: 20 TABLET ORAL at 16:41

## 2021-06-18 RX ADMIN — NITROGLYCERIN 5 MCG/MIN: 20 INJECTION INTRAVENOUS at 04:17

## 2021-06-18 RX ADMIN — AMLODIPINE BESYLATE 10 MG: 5 TABLET ORAL at 08:06

## 2021-06-18 RX ADMIN — HYDRALAZINE HYDROCHLORIDE 10 MG: 20 INJECTION INTRAMUSCULAR; INTRAVENOUS at 16:33

## 2021-06-18 RX ADMIN — SODIUM CHLORIDE, PRESERVATIVE FREE 10 ML: 5 INJECTION INTRAVENOUS at 20:14

## 2021-06-18 RX ADMIN — ASPIRIN 81 MG: 81 TABLET, COATED ORAL at 08:06

## 2021-06-18 RX ADMIN — ACETAMINOPHEN 650 MG: 325 TABLET ORAL at 07:28

## 2021-06-18 NOTE — PROGRESS NOTES
[facility] Medicine Services  Clinical Decision Unit  PROGRESS NOTE    Patient Name: Kelvin French  : 1945  MRN: [id number]    Admission Date and Time: 2021 10:17 PM  Primary Care Physician: Provider, No Known    Subjective   Subjective     CC:  F/U headache, hypertensive urgency    HPI:  Patient seen this morning. Feels better, BP is coming down. Says he has not seen a doctor since at least  when he had a bleeding ulcer.     Review of Systems  Gen-no fevers, no chills  CV-no chest pain, no palpitations  Resp-no cough, no dyspnea  GI-no N/V/D, no abd pain    All other systems reviewed and negative except any additional pertinent positives and negatives as discussed in HPI.      Objective   Objective     Vital Signs:   Temp:  [97 °F (36.1 °C)-99.4 °F (37.4 °C)] 98 °F (36.7 °C)  Heart Rate:  [] 101  Resp:  [16] 16  BP: (140-223)/() 152/107     Physical Exam:  Gen-no acute distress  HENT-NCAT, mucous membranes moist  CV-RRR, S1 S2 normal, no m/r/g  Resp-CTAB, no wheezes or rales  Abd-soft, NT, ND, +BS  Ext-no edema  Neuro-A&Ox3, no focal deficits  Skin-no rashes  Psych-appropriate mood    Result Review:  I have personally reviewed the results from the time of admission and agree with these findings:  [x]  Laboratory  [x]  Radiology  []  EKG/Telemetry   []  Pathology  []  Old records  []  Other:  Most notable findings include:    Results from last 7 days   Lab Units 21  0332   WBC 10*3/mm3 4.29   HEMOGLOBIN g/dL 13.7   HEMATOCRIT % 43.5   PLATELETS 10*3/mm3 248     Results from last 7 days   Lab Units 21  0332   SODIUM mmol/L 139   POTASSIUM mmol/L 4.2   CHLORIDE mmol/L 100   CO2 mmol/L 31.0*   BUN mg/dL 20   CREATININE mg/dL 1.42*   GLUCOSE mg/dL 147*   CALCIUM mg/dL 10.3     CT Head Without Contrast    Result Date: 2021  Senescent changes without acute abnormality. Signer Name: Osmar Johnson MD  Signed: 2021 12:58 AM  Workstation Name:  [facility]    CT Angiogram Neck    Result Date: 6/17/2021  1. The carotid arteries in the neck are normal and widely patent. The left vertebral artery is normal. 2. The right vertebral artery is diminutive in caliber throughout its length and poorly characterized. This may be a chronic finding, possibly congenital. Acute abnormality such as dissection unfortunately is not excluded without comparison studies. 3. Anterior dominant intracranial circulation with bilateral fetal origin PCAs. No intracranial flow limiting stenosis or vessel cutoff is seen. There is no aneurysm. Signer Name: Osmar Johnson MD  Signed: 6/17/2021 3:28 AM  Workstation Name: [facility]    CT Angiogram Chest With & Without Contrast    Result Date: 6/17/2021  1. No aortic aneurysm or aortic dissection. No pulmonary embolism. 2. No acute findings in the chest. 3. Small hiatal hernia. 4. Hepatic steatosis. Signer Name: Osmar Johnson MD  Signed: 6/17/2021 3:20 AM  Workstation Name: [facility]    XR Chest 1 View    Result Date: 6/17/2021  No acute cardiopulmonary findings. Signer Name: Osmar Johnson MD  Signed: 6/17/2021 12:00 AM  Workstation Name: [facility]    CT Angiogram Head    Result Date: 6/17/2021  1. The carotid arteries in the neck are normal and widely patent. The left vertebral artery is normal. 2. The right vertebral artery is diminutive in caliber throughout its length and poorly characterized. This may be a chronic finding, possibly congenital. Acute abnormality such as dissection unfortunately is not excluded without comparison studies. 3. Anterior dominant intracranial circulation with bilateral fetal origin PCAs. No intracranial flow limiting stenosis or vessel cutoff is seen. There is no aneurysm. Signer Name: Osmar Johnson MD  Signed: 6/17/2021 3:28 AM  Workstation Name:  [email or web address]  [facility]        Assessment/Plan   Assessment / Plan     Active Hospital Problems    Diagnosis  POA   • **Hypertensive urgency [I16.0]  Yes   • Cerebral atherosclerosis [I67.2]  Yes   • Elevated serum creatinine [R79.89]  Yes   • New onset type 2 diabetes mellitus (CMS/HCC) [E11.9]  Yes      Resolved Hospital Problems   No resolved problems to display.        Brief course to date:  Kelvin French is a 75 y.o. male with hx of a bleeding ulcer but otherwise no known past medical history as he has not seen a doctor since 2013, who presents due to headache and ear pain. He is found to have elevated /137 on arrival. Labs showed Cr 1.48, otherwise negative troponins, normal proBNP. CT head without contrast was negative. CTA chest no PE, but small hiatal hernia and hepatic steatosis noted. CTA head/neck revealed diffusely atherosclerotic right vertebral artery along with bilateral intracranial and extracranial atherosclerosis. Neurology was consulted. BP managed with Nitro drip and we have started him on oral medications.     Hypertensive urgency  --Was overly sensitive to Cardene drip, so switched to Nitro drip. Goal  or less for now. Have started Coreg, will titrate up to 12.5 mg BID. Also started Norvasc 10 mg daily. Avoid restarting Nitro drip--have ordered PRN IV Hydralazine.     Cerebral atherosclerosis  --Neurology has evaluated. Recommending DAPT x 90 days, then full dose ASA monotherapy thereafter for stroke prevention. Continue on high intensity statin as well.    Elevated creatinine - suspect CKD  --Cr 1.48, improved to 1.21, now back to 1.42.  --He may have underlying CKD, no prior labs for comparison however. Likely secondary to uncontrolled/untreated hypertension vs untreated diabetes.   --Will need close monitoring as outpatient.  --Need to avoid ACE/ARB as choices for antihypertensive agents because of this.    New diagnosis type 2 diabetes  --HbA1c 7.5%. he  "has been told in the past that he is \"borderline\" diabetic.   --Discussed with patient, he is agreeable to starting on Metformin at discharge--plan to start 500 mg BID and increase every 7 days as tolerated. Discussed possible GI sided effects.   --DM educator has seen.    He will need a new PCP to follow up closely regarding his BP and diabetes -- appointment has been made for 6/24/21. Hopefully home tomorrow.    DVT prophylaxis:  Mechanical DVT prophylaxis orders are present.     Discharge readiness criteria:   BP controlled.    Lianne Wu MD  06/18/21      "

## 2021-06-18 NOTE — PLAN OF CARE
Beginning of shift pt's BP elevated to 205/112. 10mg labetalol administered and BP decreased to systolic in 150s. BP continued to climb overnight, order for nitro gtt already in place- Consulted hospitalist RACHEL and nitro gtt was initiated ~0400.       Problem: Adult Inpatient Plan of Care  Goal: Plan of Care Review  Outcome: Ongoing, Progressing  Goal: Patient-Specific Goal (Individualized)  Outcome: Ongoing, Progressing  Goal: Absence of Hospital-Acquired Illness or Injury  Outcome: Ongoing, Progressing  Intervention: Identify and Manage Fall Risk  Recent Flowsheet Documentation  Taken 6/18/2021 0400 by Milly Bradshaw RN  Safety Promotion/Fall Prevention:   safety round/check completed   nonskid shoes/slippers when out of bed   activity supervised   assistive device/personal items within reach   fall prevention program maintained  Taken 6/18/2021 0200 by Milly Bradshaw RN  Safety Promotion/Fall Prevention:   safety round/check completed   nonskid shoes/slippers when out of bed   activity supervised   assistive device/personal items within reach   fall prevention program maintained  Taken 6/18/2021 0000 by Milly Bradshaw RN  Safety Promotion/Fall Prevention:   safety round/check completed   nonskid shoes/slippers when out of bed   activity supervised   assistive device/personal items within reach   fall prevention program maintained  Taken 6/17/2021 2200 by Milly Bradshaw RN  Safety Promotion/Fall Prevention:   safety round/check completed   nonskid shoes/slippers when out of bed   activity supervised   assistive device/personal items within reach   fall prevention program maintained  Taken 6/17/2021 2025 by Milly Bradshaw RN  Safety Promotion/Fall Prevention:   safety round/check completed   nonskid shoes/slippers when out of bed   fall prevention program maintained   activity supervised   assistive device/personal items within reach  Intervention: Prevent Skin Injury  Recent Flowsheet Documentation  Taken 6/18/2021 0400 by  Milly Bradshaw RN  Body Position:   supine   position changed independently  Taken 6/18/2021 0200 by Milly Bradshaw RN  Body Position: position changed independently  Taken 6/18/2021 0000 by Milly Bradshaw RN  Body Position: side-lying, left  Taken 6/17/2021 2200 by Milly Bradshaw RN  Body Position:   side-lying, right   position changed independently  Taken 6/17/2021 2025 by Milly Bradshaw RN  Body Position:   position changed independently   sitting up in bed  Intervention: Prevent and Manage VTE (venous thromboembolism) Risk  Recent Flowsheet Documentation  Taken 6/17/2021 2025 by Milly Bradshaw RN  VTE Prevention/Management:   bilateral   dorsiflexion/plantar flexion performed  Goal: Optimal Comfort and Wellbeing  Outcome: Ongoing, Progressing  Intervention: Provide Person-Centered Care  Recent Flowsheet Documentation  Taken 6/17/2021 2025 by Milly Bradshaw RN  Trust Relationship/Rapport:   care explained   choices provided   thoughts/feelings acknowledged   questions encouraged  Goal: Readiness for Transition of Care  Outcome: Ongoing, Progressing     Problem: Electrolyte Imbalance (Acute Kidney Injury/Impairment)  Goal: Serum Electrolyte Balance  Outcome: Ongoing, Progressing     Problem: Fluid Imbalance (Acute Kidney Injury/Impairment)  Goal: Optimal Fluid Balance  Outcome: Ongoing, Progressing     Problem: Hematologic Alteration (Acute Kidney Injury/Impairment)  Goal: Hemoglobin, Hematocrit and Platelets Within Normal Range  Outcome: Ongoing, Progressing     Problem: Oral Intake Inadequate (Acute Kidney Injury/Impairment)  Goal: Optimal Nutrition Intake  Outcome: Ongoing, Progressing     Problem: Renal Function Impairment (Acute Kidney Injury/Impairment)  Goal: Effective Renal Function  Outcome: Ongoing, Progressing     Problem: Hypertension Acute  Goal: Blood Pressure Within Desired Range  Outcome: Ongoing, Progressing     Problem: Electrolyte Imbalance  Goal: Electrolyte Balance  Outcome: Ongoing, Progressing

## 2021-06-18 NOTE — CASE MANAGEMENT/SOCIAL WORK
Discharge Planning Assessment  [facility]     Patient Name: Kelvin French  MRN: [id number]  Today's Date: 6/18/2021    Admit Date: 6/16/2021    Discharge Needs Assessment     Row Name 06/18/21 7400       Living Environment    Lives With  alone    Current Living Arrangements  home/apartment/condo    Family Caregiver if Needed  sibling(s)    Quality of Family Relationships  unable to assess    Able to Return to Prior Arrangements  yes       Resource/Environmental Concerns    Resource/Environmental Concerns  none    Transportation Concerns  car, none       Transition Planning    Patient/Family Anticipates Transition to  home with family    Patient/Family Anticipated Services at Transition  none    Transportation Anticipated  family or friend will provide       Discharge Needs Assessment    Readmission Within the Last 30 Days  no previous admission in last 30 days    Equipment Currently Used at Home  none    Equipment Needed After Discharge  none        Discharge Plan     Row Name 06/18/21 155       Plan    Plan  Home    Patient/Family in Agreement with Plan  yes    Plan Comments  Contacted Mr. French by phone for initial discharge planning assessment.  Mr. French lives alone in a 2 story home in [facility].  He states that he is ambulating independently.  He denies any DME or home health needs.  He states that his brother, Oliver Delcid, or Aunt Anastasia Delcid, can assist him at home, if needed.  Arranged for a new PCP through [facility] with Joshua RAMOS and the appointment is noted in the AVS.    Mr. French states his brother will transport him home when he is discharged.    CM will continue to follow.    Final Discharge Disposition Code  01 - home or self-care        Continued Care and Services - Admitted Since 6/16/2021    Coordination has not been started for this encounter.         Demographic Summary     Row Name 06/18/21 1542       General Information    Admission Type  inpatient    Arrived From  home     Reason for Consult  discharge planning    General Information Comments  PCP:  new MD arranged through the [facility] and noted on AVS       Contact Information    Permission Granted to Share Info With      Contact Information Comments  Aunt:  Anastasia Delcid, ph [phone]        Functional Status     Row Name 06/18/21 1546       Functional Status    Usual Activity Tolerance  good    Current Activity Tolerance  good       Functional Status, IADL    Medications  independent    Meal Preparation  independent    Housekeeping  independent    Laundry  independent    Shopping  independent       Mental Status    General Appearance WDL  WDL        Psychosocial    No documentation.       Abuse/Neglect    No documentation.       Legal    No documentation.       Substance Abuse    No documentation.       Patient Forms    No documentation.           Sofi Benites, RN

## 2021-06-18 NOTE — CONSULTS
Clinical Nutrition     Nutrition Education   Reason for Visit:   Physician Consult       Patient Name: Kelvin French  YOB: 1945  MRN: [id number]  Date of Encounter: 06/18/21 15:16 EDT  Admission date: 6/16/2021        Assessment   Applicable Diagnoses   New dx T2DM  Cerebral atherosclerosis      Labs reviewed   Yes    Nutrition Diagnosis     Problem Food and nutrition knowledge deficit   Etiology No prior need for education   Signs/Symptoms New dx T2DM (HgbA1c = 7.5 on 6/17/21)     Goal:     Nutrition related lab values to trend towards desirable value    Nutrition Intervention     RD provided patient with T2DM and heart healthy nutrition education/written materials from Nutrition Care Manual (Heart Healthy Nutrition Therapy) and [employer]: the diabetes education program ([place] Healthy Meals). RD reviewed relationship of carbohydrates, blood glucose levels, and insulin; recommended amount of carbohydrates per meal for gender; food/beverages sources of carbohydrates; nutrition label reading; how to determine carbohydrate amount for items without nutrition label; portion control; plate method. Also reviewed heart-healthy foods versus foods patient should limit/avoid and tips for choosing heart-healthy foods. RD offered to send referral to [facility] outpatient nutrition education/counseling clinic, but patient declines at this time.    RD and patient discussed specific foods patient commonly eats at meals/snacks and how the plate method could be used to control carbohydrates for these meals. Discussed beverages as well - patient drinks coca-cola and water at home. Patient plans to decrease the amount of coca-cola he drinks rather than switching over to diet as he dislikes the taste of diet. Patient states he may start drinking juice to increase his vitamin intake. RD advised patient to take a daily MVI instead as juice contains a lot of sugar and can increase blood sugars/HgbA1c.  Recommended limiting juice to 1/2 cup serving once during the day if he does decide to drink it. Patient is aware he eats high-sugar foods on a daily basis and would benefit from decreasing portion size as well as frequency and replacing these snacks with healthier options.      Monitoring/Evaluation:     RD to follow up PRN    Patient to follow up PRN on outpatient basis and Pt likely requires additional education/support via outpatient nutrition education    >>>Patient declines outpatient education referral at this time      Gillian Sim RD  Time Spent: 45 min

## 2021-06-18 NOTE — PLAN OF CARE
Patient's nitro gtt was stopped this morning around 0900 for SBP < 140.  Blood pressure went back up 180/111 at 1230.  Provider placed a one time order or coreg 6.25mg and PRN hydralazine 10 mg for SBP >170.  SBP continued to fluctuate between 150-190.  Hydralazine was given at 1633 and at 1800 patient's blood pressure was 165/105.  Patients heart rate has remained stable and ECG in NSR.  Patient has been comfortable with no complaints.    Problem: Adult Inpatient Plan of Care  Goal: Plan of Care Review  Outcome: Ongoing, Progressing  Goal: Patient-Specific Goal (Individualized)  Outcome: Ongoing, Progressing  Goal: Absence of Hospital-Acquired Illness or Injury  Outcome: Ongoing, Progressing  Intervention: Identify and Manage Fall Risk  Recent Flowsheet Documentation  Taken 6/18/2021 1620 by Jenniffer Blake, RN  Safety Promotion/Fall Prevention:   activity supervised   assistive device/personal items within reach   clutter free environment maintained   nonskid shoes/slippers when out of bed   room organization consistent   safety round/check completed  Taken 6/18/2021 1415 by Jenniffer Blake, RN  Safety Promotion/Fall Prevention:   activity supervised   assistive device/personal items within reach   clutter free environment maintained   nonskid shoes/slippers when out of bed   room organization consistent   safety round/check completed  Taken 6/18/2021 1225 by Jenniffer Blake, RN  Safety Promotion/Fall Prevention:   activity supervised   assistive device/personal items within reach   clutter free environment maintained   nonskid shoes/slippers when out of bed   room organization consistent   safety round/check completed  Taken 6/18/2021 1020 by Jenniffer Blake, RN  Safety Promotion/Fall Prevention:   activity supervised   assistive device/personal items within reach   clutter free environment maintained   nonskid shoes/slippers when out of bed   room organization consistent   safety round/check completed  Taken  6/18/2021 0805 by Jenniffer Blake RN  Safety Promotion/Fall Prevention:   activity supervised   assistive device/personal items within reach   clutter free environment maintained   nonskid shoes/slippers when out of bed   room organization consistent   safety round/check completed   lighting adjusted  Intervention: Prevent Skin Injury  Recent Flowsheet Documentation  Taken 6/18/2021 1620 by Jenniffer Blake RN  Body Position: position changed independently  Taken 6/18/2021 1415 by Jenniffer Blake RN  Body Position: position changed independently  Skin Protection: adhesive use limited  Taken 6/18/2021 1225 by Jenniffer Blake RN  Body Position:   position changed independently   sitting up in bed  Skin Protection: adhesive use limited  Taken 6/18/2021 1020 by Jenniffer Blake RN  Body Position: position changed independently  Taken 6/18/2021 0805 by Jenniffer Blake RN  Body Position:   position changed independently   sitting up in bed  Skin Protection: adhesive use limited  Intervention: Prevent and Manage VTE (venous thromboembolism) Risk  Recent Flowsheet Documentation  Taken 6/18/2021 1620 by Jenniffer Blake RN  VTE Prevention/Management: dorsiflexion/plantar flexion performed  Taken 6/18/2021 1415 by Jenniffer Blake RN  VTE Prevention/Management: dorsiflexion/plantar flexion performed  Taken 6/18/2021 1225 by Jenniffer Blake RN  VTE Prevention/Management: dorsiflexion/plantar flexion performed  Taken 6/18/2021 0805 by Jenniffer Blake RN  VTE Prevention/Management: dorsiflexion/plantar flexion performed  Intervention: Prevent Infection  Recent Flowsheet Documentation  Taken 6/18/2021 1620 by Jenniffer Blake RN  Infection Prevention:   visitors restricted/screened   single patient room provided   environmental surveillance performed  Taken 6/18/2021 1415 by Jenniffer Blake RN  Infection Prevention:   environmental surveillance performed   single patient room provided   visitors restricted/screened  Taken  6/18/2021 1225 by Jenniffer Blake RN  Infection Prevention:   visitors restricted/screened   single patient room provided   environmental surveillance performed  Taken 6/18/2021 1020 by Jenniffer Blake RN  Infection Prevention:   visitors restricted/screened   single patient room provided   environmental surveillance performed  Taken 6/18/2021 0805 by Jenniffer Blake RN  Infection Prevention:   environmental surveillance performed   single patient room provided   visitors restricted/screened  Goal: Optimal Comfort and Wellbeing  Outcome: Ongoing, Progressing  Intervention: Provide Person-Centered Care  Recent Flowsheet Documentation  Taken 6/18/2021 1620 by Jenniffer Blake RN  Trust Relationship/Rapport: care explained  Taken 6/18/2021 1415 by Jenniffer Blake RN  Trust Relationship/Rapport: care explained  Taken 6/18/2021 1225 by Jenniffer Blake RN  Trust Relationship/Rapport: care explained  Taken 6/18/2021 1020 by Jenniffer Blake RN  Trust Relationship/Rapport: care explained  Taken 6/18/2021 0805 by Jenniffer Blake RN  Trust Relationship/Rapport:   care explained   questions answered  Goal: Readiness for Transition of Care  Outcome: Ongoing, Progressing     Problem: Electrolyte Imbalance (Acute Kidney Injury/Impairment)  Goal: Serum Electrolyte Balance  Outcome: Ongoing, Progressing     Problem: Fluid Imbalance (Acute Kidney Injury/Impairment)  Goal: Optimal Fluid Balance  Outcome: Ongoing, Progressing     Problem: Hematologic Alteration (Acute Kidney Injury/Impairment)  Goal: Hemoglobin, Hematocrit and Platelets Within Normal Range  Outcome: Ongoing, Progressing     Problem: Oral Intake Inadequate (Acute Kidney Injury/Impairment)  Goal: Optimal Nutrition Intake  Outcome: Ongoing, Progressing     Problem: Renal Function Impairment (Acute Kidney Injury/Impairment)  Goal: Effective Renal Function  Outcome: Ongoing, Progressing  Intervention: Monitor and Support Renal Function  Recent Flowsheet  Documentation  Taken 6/18/2021 1620 by Jenniffer Blake RN  Medication Review/Management: medications reviewed  Taken 6/18/2021 1415 by Jenniffer Blake RN  Medication Review/Management: medications reviewed  Taken 6/18/2021 1225 by Jenniffer Blake RN  Medication Review/Management: medications reviewed  Taken 6/18/2021 1020 by Jenniffer Blake RN  Medication Review/Management: medications reviewed  Taken 6/18/2021 0805 by Jenniffer Blake RN  Medication Review/Management: medications reviewed     Problem: Hypertension Acute  Goal: Blood Pressure Within Desired Range  Outcome: Ongoing, Progressing  Intervention: Normalize Blood Pressure  Recent Flowsheet Documentation  Taken 6/18/2021 1620 by Jenniffer Blake RN  Medication Review/Management: medications reviewed  Taken 6/18/2021 1415 by Jenniffer Blake RN  Medication Review/Management: medications reviewed  Taken 6/18/2021 1225 by Jenniffer Blake RN  Sensory Stimulation Regulation: lighting decreased  Medication Review/Management: medications reviewed  Taken 6/18/2021 1020 by Jenniffer Blake RN  Medication Review/Management: medications reviewed  Taken 6/18/2021 0805 by Jenniffer Blake RN  Sensory Stimulation Regulation: lighting decreased  Medication Review/Management: medications reviewed     Problem: Electrolyte Imbalance  Goal: Electrolyte Balance  Outcome: Ongoing, Progressing   Will continue to monitor.

## 2021-06-18 NOTE — PAYOR COMM NOTE
"Quincy French (75 y.o. Male)     Date of Birth Social Security Number Address Home Phone MRN    1945  [address]  [place] [id number] [phone] [id number]    Advent Marital Status          [facility] Single       Admission Date Admission Type Admitting Provider Attending Provider Department, Room/Bed    21 Emergency Lianne Wu MD Reddy, Mayuri V, MD [facility] 5F, S528/1    Discharge Date Discharge Disposition Discharge Destination                       Attending Provider: Lianne Wu MD    Allergies: No Known Allergies    Isolation: None   Infection: None   Code Status: CPR    Ht: 175.3 cm (69\")   Wt: 88.5 kg (195 lb)    Admission Cmt: None   Principal Problem: Hypertensive urgency [I16.0]                 Active Insurance as of 2021     Primary Coverage     Payor Plan Insurance Group Employer/Plan Group    ANTHEM MEDICARE REPLACEMENT ANTHEM MEDICARE ADVANTAGE [id number]     Payor Plan Address Payor Plan Phone Number Payor Plan Fax Number Effective Dates    [address] [phone]  2018 - None Entered    [facility] [address]       Subscriber Name Subscriber Birth Date Member ID       POLOQUINCY LAUREN 1945 [id number]                 Emergency Contacts      (Rel.) Home Phone Work Phone Mobile Phone    DENNIS BREEN (Other) -- -- [phone]            Insurance Information                ANTHEM MEDICARE REPLACEMENT/ANTHEM MEDICARE ADVANTAGE Phone: [phone]    Subscriber: Quincy French Subscriber#: [id number]    Group#: [id number] Precert#:              History & Physical      Tyrell Perez DO at 21 0244              [facility] Medicine Services  HISTORY AND PHYSICAL    Patient Name: Quincy French  : 1945  MRN: [id number]  Primary Care Physician: Provider, No Known  Date of admission: 2021    Subjective   Subjective     Chief Complaint:  headache    HPI:  Quincy French is a " 75 y.o. male presents to the ED with complaints of headaches and ear pain.  Patient reports that when he was at the [facility] in [place] he feels that someone implanted something in his ears.  He has been experiencing intermittent pain and buzzing in his ears for years.  He reports having intermittent headaches for the past 2 weeks with associated sensations of warmth coming over his head and radiating down into his chest.  He has been taking Tylenol at home for the past week for his headaches.  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  He denies fever, shortness of air, chest pain, nausea, vomiting, abdominal pain, diarrhea, or any other complaints at this time.  CT head shows senescent changes without acute abnormality.  Chest xray is negative.  Upon arrival his blood pressure was 248/135, and he was started on a cardene drip in the ED.  Patient is being admitted to the Hospitalist for further evaluation and management.    COVID Details:        Symptoms: [x] NONE [] Fever []  Cough [] Shortness of breath [] Change in taste or smell    Patient received 2 doses of the Moderna vaccine    Review of Systems   Constitutional: Negative.    HENT: Positive for ear pain. Negative for congestion, sinus pressure, sinus pain and sore throat.    Eyes: Positive for photophobia.   Respiratory: Negative.    Cardiovascular: Negative.    Gastrointestinal: Negative.    Endocrine: Negative.    Genitourinary: Negative.    Musculoskeletal: Negative.    Skin: Negative.    Allergic/Immunologic: Negative.    Neurological: Positive for headaches.   Hematological: Negative.    Psychiatric/Behavioral: Negative.         All other systems reviewed and are negative.     Personal History     Past Medical History:   Diagnosis Date   • Peptic ulceration        No past surgical history on file.    Family History:   pertinent FHx was reviewed and unremarkable.      Social History:  reports that he has quit smoking. He has never used smokeless tobacco. He reports previous alcohol use. He reports that he does not use drugs.  Social History     Social History Narrative   • Not on file       Medications:  acetaminophen    No Known Allergies    Objective   Objective     Vital Signs:   Temp:  [98 °F (36.7 °C)] 98 °F (36.7 °C)  Heart Rate:  [80-93] 90  Resp:  [16] 16  BP: (159-248)/() 178/87    Physical Exam   Constitutional: Awake, alert, resting in bed  Eyes: PERRLA, sclerae anicteric, no conjunctival injection  HENT: NCAT, mucous membranes moist  Neck: Supple, no thyromegaly, no lymphadenopathy, trachea midline  Respiratory: Clear to auscultation bilaterally, nonlabored respirations   Cardiovascular: RRR, no murmurs, rubs, or gallops, palpable pedal pulses bilaterally  Gastrointestinal: Positive bowel sounds, soft, nontender, nondistended  Musculoskeletal: No bilateral ankle edema, no clubbing or cyanosis to extremities  Psychiatric: Appropriate affect, cooperative  Neurologic: Oriented x 3, strength symmetric in all extremities, Cranial Nerves grossly intact to confrontation, speech clear  Skin: No rashes       Result Review:  I have personally reviewed the results from the time of this admission to 06/17/21 2:44 AM EDT and agree with these findings:  [x]  Laboratory  []  Microbiology  [x]  Radiology  [x]  EKG/Telemetry   []  Cardiology/Vascular   []  Pathology  []  Old records  []  Other:  Most notable findings include:       LAB RESULTS:      Lab 06/16/21  2346   WBC 5.44   HEMOGLOBIN 13.6   HEMATOCRIT 42.8   PLATELETS 245   NEUTROS ABS 2.34   IMMATURE GRANS (ABS) 0.00   LYMPHS ABS 2.49   MONOS ABS 0.42   EOS ABS 0.14   MCV 83.3         Lab 06/16/21  2346   SODIUM 140   POTASSIUM 3.9   CHLORIDE 101   CO2 29.0   ANION GAP 10.0   BUN 22   CREATININE 1.48*   GLUCOSE 131*   CALCIUM 10.4         Lab 06/16/21  2346   TOTAL PROTEIN 7.5   ALBUMIN 4.50   GLOBULIN 3.0   ALT  (SGPT) 28   AST (SGOT) 58*   BILIRUBIN 0.6   ALK PHOS 58         Lab 06/16/21  2346   PROBNP 75.9   TROPONIN T <0.010                   Microbiology Results (last 10 days)     ** No results found for the last 240 hours. **          CT Head Without Contrast    Result Date: 6/17/2021  CT Head WO HISTORY: Headache and hypertension today. TECHNIQUE: Axial unenhanced head CT with multiplanar reformats. Radiation dose reduction techniques included automated exposure control or exposure modulation based on body size. Count of known CT and cardiac nuc med studies performed in previous 12 months: 0. COMPARISON: None. FINDINGS: Ventricular size and configuration are normal. No acute infarct or hemorrhage is identified. There are no masses. There is no skull fracture.  There is atrophy with chronic small vessel ischemic disease in the white matter. Visualized paranasal sinuses and mastoid air cells are clear. There is a small lipoma near the basilar tip measuring about 7 x 6 x 6 mm in size.     Impression: Senescent changes without acute abnormality. Signer Name: Osmar Johnson MD  Signed: 6/17/2021 12:58 AM  Workstation Name: [email or web address]  [facility]    CT Angiogram Neck    Result Date: 6/17/2021  CTA Head, CTA Neck INDICATION: Hypertension and headache. TECHNIQUE: CT angiogram of the head and neck with and without IV contrast. 3-D reconstructions were obtained and reviewed.  Evaluation for a significant carotid arterial stenosis is based on the NASCET criteria. Radiation dose reduction techniques included automated exposure control or exposure modulation based on body size. Count of known CT and cardiac nuc med studies performed in previous 12 months: 1. COMPARISON: Head CT same day FINDINGS: CTA neck:  Please see chest CTA report dictated separately. The left vertebral artery has a separate origin from the aortic arch as an anatomic variant. The left vertebral artery is widely patent and forms a  normal caliber basilar artery. The right vertebral artery is markedly diminutive in size and overall poorly characterized. This may very well be a chronic finding as there are no comparison studies. However, an acute right vertebral artery abnormality such as a dissection cannot be excluded. The carotid vessels are tortuous. However, the carotid arteries are widely patent on both sides of the neck. There is no plaque or evidence of stenosis on either side. CTA head:  The basilar artery is narrow in caliber but widely patent. This appears to be secondary to an anterior dominant circulation with bilateral fetal origin PCAs. No intracranial flow-limiting stenosis or focal vessel cut off is seen. There is no aneurysm. Major dural venous sinuses are patent. No pathologic enhancement is identified in the brain.     Impression: 1. The carotid arteries in the neck are normal and widely patent. The left vertebral artery is normal. 2. The right vertebral artery is diminutive in caliber throughout its length and poorly characterized. This may be a chronic finding, possibly congenital. Acute abnormality such as dissection unfortunately is not excluded without comparison studies. 3. Anterior dominant intracranial circulation with bilateral fetal origin PCAs. No intracranial flow limiting stenosis or vessel cutoff is seen. There is no aneurysm. Signer Name: Osmar Johnson MD  Signed: 6/17/2021 3:28 AM  Workstation Name: [facility]  [facility]    CT Angiogram Chest With & Without Contrast    Result Date: 6/17/2021  CT ANGIOGRAM CHEST W WO CONTRAST INDICATION: Chest pain. Severe hypertension. TECHNIQUE: CT angiogram of the chest with and without IV contrast. 3-D reconstructions were obtained and reviewed.   Radiation dose reduction techniques included automated exposure control or exposure modulation based on body size. Count of known CT and cardiac nuc med studies performed in previous 12 months: 1.  COMPARISON: None available. FINDINGS: There is no aortic aneurysm or aortic dissection to the level of the renal arteries. The great vessel origins are normal. The left vertebral artery has a separate origin from the aortic arch. No pulmonary embolism is identified. There is no pleural or pericardial effusion. There is no adenopathy. Upper abdominal images show hepatic steatosis. Small hiatal hernia is present. The lungs are clear. No CT findings present to indicate pneumonia. Note: CT may be negative in the earliest stages of COVID-19.     Impression: 1. No aortic aneurysm or aortic dissection. No pulmonary embolism. 2. No acute findings in the chest. 3. Small hiatal hernia. 4. Hepatic steatosis. Signer Name: Osmar Johnson MD  Signed: 6/17/2021 3:20 AM  Workstation Name: [facility]    XR Chest 1 View    Result Date: 6/17/2021  CR Chest 1 Vw INDICATION: Hypertension and headache. COMPARISON:  None available. FINDINGS: Single portable AP view(s) of the chest.  There is mild cardiomegaly. Mediastinal contours are otherwise normal. Pulmonary vascularity is normal. The lungs are clear. No pneumothorax is seen. There is mild eventration of the right hemidiaphragm.     Impression: No acute cardiopulmonary findings. Signer Name: Osmar Johnson MD  Signed: 6/17/2021 12:00 AM  Workstation Name: [facility]    CT Angiogram Head    Result Date: 6/17/2021  CTA Head, CTA Neck INDICATION: Hypertension and headache. TECHNIQUE: CT angiogram of the head and neck with and without IV contrast. 3-D reconstructions were obtained and reviewed.  Evaluation for a significant carotid arterial stenosis is based on the NASCET criteria. Radiation dose reduction techniques included automated exposure control or exposure modulation based on body size. Count of known CT and cardiac nuc med studies performed in previous 12 months: 1. COMPARISON: Head CT same day FINDINGS:  CTA neck:  Please see chest CTA report dictated separately. The left vertebral artery has a separate origin from the aortic arch as an anatomic variant. The left vertebral artery is widely patent and forms a normal caliber basilar artery. The right vertebral artery is markedly diminutive in size and overall poorly characterized. This may very well be a chronic finding as there are no comparison studies. However, an acute right vertebral artery abnormality such as a dissection cannot be excluded. The carotid vessels are tortuous. However, the carotid arteries are widely patent on both sides of the neck. There is no plaque or evidence of stenosis on either side. CTA head:  The basilar artery is narrow in caliber but widely patent. This appears to be secondary to an anterior dominant circulation with bilateral fetal origin PCAs. No intracranial flow-limiting stenosis or focal vessel cut off is seen. There is no aneurysm. Major dural venous sinuses are patent. No pathologic enhancement is identified in the brain.     Impression: 1. The carotid arteries in the neck are normal and widely patent. The left vertebral artery is normal. 2. The right vertebral artery is diminutive in caliber throughout its length and poorly characterized. This may be a chronic finding, possibly congenital. Acute abnormality such as dissection unfortunately is not excluded without comparison studies. 3. Anterior dominant intracranial circulation with bilateral fetal origin PCAs. No intracranial flow limiting stenosis or vessel cutoff is seen. There is no aneurysm. Signer Name: Osmar Johnson MD  Signed: 6/17/2021 3:28 AM  Workstation [facility]: [facility]  [facility]          Assessment/Plan   Assessment & Plan       Hypertensive urgency    Elevated serum creatinine    Hyperglycemia    Kelvin French is a 75 y.o. male presents to the ED with complaints of headaches.    Assessment and Plan:    Hypertensive  "urgency  Headache  --Hydralazine x 1 dose given in the ED  --CTA chest--No acute findings, no aortic aneurysm or aortic dissection, no PE  --CT head shows senescent changes without acute abnormality  --CTA head and neck  --continue cardene drip--maintain -190 for first 24 hours. Sensitive to this med and had to be turned off due to rapid correction. Suspect he would benefit from nifedipine orally after 24 hours.   --tylenol for HA  --am labs    Elevated creatinine  --creatinine 1.48, baseline unknown  --urine studies  --bmp in the am    Hyperglycemia  --a1c in the am    DVT prophylaxis:  mechanical    CODE STATUS:  full  Level Of Support Discussed With: Patient  Code Status: CPR  Medical Interventions (Level of Support Prior to Arrest): Full      This note has been completed as part of a split-shared workflow.   Electronically signed by RACHEL Ordaz, 06/17/21, 3:44 AM EDT.           Attending   Admission Attestation       I have seen and examined the patient, performing an independent face-to-face diagnostic evaluation with plan of care reviewed and developed with the advanced practice clinician (APC).      Brief Summary Statement:   Kelvin French is a 75 y.o. male with no known past medical history who presents to the ER with complaints of headache and ear pain.    Patient reports that he was recently at the [facility] and [place] where he feels like someone \"implanted something in my ears\".  Since that time he has had intermittent pain in his ears bilaterally, headaches, and \"excruciating\" warm sensation that radiates from his head and down to his chest.    He has been taking Tylenol for the headaches which has not improved his pain.  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    Denies any chest pain or pressure, unilateral weakness, syncope, shortness of air, fever, chills, cough, or any " other symptoms.    Work-up in the ER reveals significantly elevated blood pressure minimally responsive to hydralazine.  Patient significantly sensitive to Cardene and had to be discontinued and transition to nitroglycerin drip.    Remainder of detailed HPI is as noted by APC and has been reviewed and/or edited by me for completeness.    Attending Physical Exam:  Constitutional: Awake, alert  Eyes: PERRLA, sclerae anicteric, no conjunctival injection decreased vision bilaterally worse on the left where he is nearly completely blind which is chronic  HENT: NCAT, mucous membranes moist  Neck: Supple, no thyromegaly, no lymphadenopathy, trachea midline  Respiratory: Clear to auscultation bilaterally, nonlabored respirations   Cardiovascular: RRR, no murmurs, rubs, or gallops, palpable pedal pulses bilaterally  Gastrointestinal: Positive bowel sounds, soft, nontender, nondistended  Musculoskeletal: No bilateral ankle edema, no clubbing or cyanosis to extremities  Psychiatric: Appropriate affect, cooperative  Neurologic: Oriented x 3, strength symmetric in all extremities, Cranial Nerves grossly intact to confrontation, speech clear  Skin: No rashes    Brief Assessment/Plan :  See detailed assessment and plan developed with APC which I have reviewed and/or edited for completeness.        Admission Status: I believe that this patient meets INPATIENT status due to hypertensive urgency.  I feel patient’s risk for adverse outcomes and need for care warrant INPATIENT evaluation and I predict the patient’s care encounter to likely last beyond 2 midnights.        Tyrlel Perez DO  06/17/21                      Electronically signed by Tyrell Perez DO at 06/17/21 0430         Lab Results (last 24 hours)     Procedure Component Value Units Date/Time    POC Glucose Once [[id number]]  (Abnormal) Collected: 06/18/21 0718    Specimen: Blood Updated: 06/18/21 0719     Glucose 133 mg/dL     Basic Metabolic Panel [[id number]]   (Abnormal) Collected: 06/18/21 0332    Specimen: Blood Updated: 06/18/21 0412     Glucose 147 mg/dL      BUN 20 mg/dL      Creatinine 1.42 mg/dL      Sodium 139 mmol/L      Potassium 4.2 mmol/L      Chloride 100 mmol/L      CO2 31.0 mmol/L      Calcium 10.3 mg/dL      eGFR  African Amer 59 mL/min/1.73      BUN/Creatinine Ratio 14.1     Anion Gap 8.0 mmol/L     Narrative:      GFR Normal >60  Chronic Kidney Disease <60  Kidney Failure <15      CBC (No Diff) [[id number]]  (Normal) Collected: 06/18/21 0332    Specimen: Blood Updated: 06/18/21 0354     WBC 4.29 10*3/mm3      RBC 5.11 10*6/mm3      Hemoglobin 13.7 g/dL      Hematocrit 43.5 %      MCV 85.1 fL      MCH 26.8 pg      MCHC 31.5 g/dL      RDW 14.7 %      RDW-SD 45.8 fl      MPV 10.0 fL      Platelets 248 10*3/mm3     POC Glucose Once [[id number]]  (Normal) Collected: 06/17/21 2037    Specimen: Blood Updated: 06/17/21 2049     Glucose 101 mg/dL     POC Glucose Once [[id number]]  (Normal) Collected: 06/17/21 1539    Specimen: Blood Updated: 06/17/21 1540     Glucose 112 mg/dL         Imaging Results (Last 48 Hours)     Procedure Component Value Units Date/Time    CT Angiogram Head [[id number]] Collected: 06/17/21 0328     Updated: 06/17/21 0330    Narrative:      CTA Head, CTA Neck    INDICATION:   Hypertension and headache.    TECHNIQUE:   CT angiogram of the head and neck with and without IV contrast. 3-D reconstructions were obtained and reviewed.  Evaluation for a significant carotid arterial stenosis is based on the NASCET criteria. Radiation dose reduction techniques included  automated exposure control or exposure modulation based on body size. Count of known CT and cardiac nuc med studies performed in previous 12 months: 1.     COMPARISON:   Head CT same day    FINDINGS:   CTA neck:  Please see chest CTA report dictated separately. The left vertebral artery has a separate origin from the aortic arch as an anatomic variant. The left vertebral artery is widely  patent and forms a normal caliber basilar artery. The right  vertebral artery is markedly diminutive in size and overall poorly characterized. This may very well be a chronic finding as there are no comparison studies. However, an acute right vertebral artery abnormality such as a dissection cannot be excluded.  The carotid vessels are tortuous. However, the carotid arteries are widely patent on both sides of the neck. There is no plaque or evidence of stenosis on either side.    CTA head:  The basilar artery is narrow in caliber but widely patent. This appears to be secondary to an anterior dominant circulation with bilateral fetal origin PCAs. No intracranial flow-limiting stenosis or focal vessel cut off is seen. There is no  aneurysm. Major dural venous sinuses are patent. No pathologic enhancement is identified in the brain.      Impression:        1. The carotid arteries in the neck are normal and widely patent. The left vertebral artery is normal.  2. The right vertebral artery is diminutive in caliber throughout its length and poorly characterized. This may be a chronic finding, possibly congenital. Acute abnormality such as dissection unfortunately is not excluded without comparison studies.  3. Anterior dominant intracranial circulation with bilateral fetal origin PCAs. No intracranial flow limiting stenosis or vessel cutoff is seen. There is no aneurysm.    Signer Name: Osmar Johnson MD   Signed: 6/17/2021 3:28 AM   Workstation Name: [email or web address]    [facility] [place]    CT Angiogram Neck [[id number]] Collected: 06/17/21 0328     Updated: 06/17/21 0330    Narrative:      CTA Head, CTA Neck    INDICATION:   Hypertension and headache.    TECHNIQUE:   CT angiogram of the head and neck with and without IV contrast. 3-D reconstructions were obtained and reviewed.  Evaluation for a significant carotid arterial stenosis is based on the NASCET criteria. Radiation dose reduction techniques  included  automated exposure control or exposure modulation based on body size. Count of known CT and cardiac nuc med studies performed in previous 12 months: 1.     COMPARISON:   Head CT same day    FINDINGS:   CTA neck:  Please see chest CTA report dictated separately. The left vertebral artery has a separate origin from the aortic arch as an anatomic variant. The left vertebral artery is widely patent and forms a normal caliber basilar artery. The right  vertebral artery is markedly diminutive in size and overall poorly characterized. This may very well be a chronic finding as there are no comparison studies. However, an acute right vertebral artery abnormality such as a dissection cannot be excluded.  The carotid vessels are tortuous. However, the carotid arteries are widely patent on both sides of the neck. There is no plaque or evidence of stenosis on either side.    CTA head:  The basilar artery is narrow in caliber but widely patent. This appears to be secondary to an anterior dominant circulation with bilateral fetal origin PCAs. No intracranial flow-limiting stenosis or focal vessel cut off is seen. There is no  aneurysm. Major dural venous sinuses are patent. No pathologic enhancement is identified in the brain.      Impression:        1. The carotid arteries in the neck are normal and widely patent. The left vertebral artery is normal.  2. The right vertebral artery is diminutive in caliber throughout its length and poorly characterized. This may be a chronic finding, possibly congenital. Acute abnormality such as dissection unfortunately is not excluded without comparison studies.  3. Anterior dominant intracranial circulation with bilateral fetal origin PCAs. No intracranial flow limiting stenosis or vessel cutoff is seen. There is no aneurysm.    Signer Name: Osmar Johnson MD   Signed: 6/17/2021 3:28 AM   Workstation Name: [email or web address]    [facility]    CT Angiogram Chest With  & Without Contrast [[id number]] Collected: 06/17/21 0320     Updated: 06/17/21 0322    Narrative:      CT ANGIOGRAM CHEST W WO CONTRAST    INDICATION:   Chest pain. Severe hypertension.    TECHNIQUE:   CT angiogram of the chest with and without IV contrast. 3-D reconstructions were obtained and reviewed.   Radiation dose reduction techniques included automated exposure control or exposure modulation based on body size. Count of known CT and cardiac nuc  med studies performed in previous 12 months: 1.     COMPARISON:   None available.    FINDINGS:   There is no aortic aneurysm or aortic dissection to the level of the renal arteries. The great vessel origins are normal. The left vertebral artery has a separate origin from the aortic arch. No pulmonary embolism is identified. There is no pleural or  pericardial effusion. There is no adenopathy. Upper abdominal images show hepatic steatosis. Small hiatal hernia is present. The lungs are clear. No CT findings present to indicate pneumonia. Note: CT may be negative in the earliest stages of COVID-19.      Impression:        1. No aortic aneurysm or aortic dissection. No pulmonary embolism.  2. No acute findings in the chest.  3. Small hiatal hernia.  4. Hepatic steatosis.    Signer Name: Osmar Johnson MD   Signed: 6/17/2021 3:20 AM   Workstation Name: [facility]    [facility]    CT Head Without Contrast [[id number]] Collected: 06/17/21 0058     Updated: 06/17/21 0101    Narrative:      CT Head WO    HISTORY:   Headache and hypertension today.    TECHNIQUE:   Axial unenhanced head CT with multiplanar reformats. Radiation dose reduction techniques included automated exposure control or exposure modulation based on body size. Count of known CT and cardiac nuc med studies performed in previous 12 months: 0.     COMPARISON:   None.    FINDINGS:   Ventricular size and configuration are normal. No acute infarct or hemorrhage is identified. There are no  masses. There is no skull fracture.  There is atrophy with chronic small vessel ischemic disease in the white matter. Visualized paranasal sinuses  and mastoid air cells are clear. There is a small lipoma near the basilar tip measuring about 7 x 6 x 6 mm in size.      Impression:      Senescent changes without acute abnormality.    Signer Name: Osmar Johnson MD   Signed: 6/17/2021 12:58 AM   Workstation Name: [facility]    Radiology [facility]    XR Chest 1 View [[id number]] Collected: 06/17/21 0000     Updated: 06/17/21 0002    Narrative:      CR Chest 1 Vw    INDICATION:   Hypertension and headache.     COMPARISON:    None available.    FINDINGS:  Single portable AP view(s) of the chest.  There is mild cardiomegaly. Mediastinal contours are otherwise normal. Pulmonary vascularity is normal. The lungs are clear. No pneumothorax is seen. There is mild eventration of the right hemidiaphragm.      Impression:      No acute cardiopulmonary findings.    Signer Name: Osmar Johnson MD   Signed: 6/17/2021 12:00 AM   Workstation Name: [facility]    Radiology [facility]           Physician Progress Notes (last 48 hours) (Notes from 06/16/21 0748 through 06/18/21 0748)      Kori Panchal MD at 06/17/21 0756                [facility] Medicine Services  ADMISSION FOLLOW-UP NOTE          Patient admitted after midnight, H&P by my partner performed earlier on today's date reviewed.  Interim findings, labs, and charting also reviewed.        The [facility] Hospital Problem List has been managed and updated to include any new diagnoses:  Active Hospital Problems    Diagnosis  POA   • **Hypertensive urgency [I16.0]  Yes   • Elevated serum creatinine [R79.89]  Yes   • Hyperglycemia [R73.9]  Yes      Resolved Hospital Problems   No resolved problems to display.       Mr. French is a 76 yo with no known past medical history who presented to the ER with complaints of headache  and ear pain found to have hypertensive urgency.      ADDITIONAL PLAN:      HTN urgency  --continue cardene gtt, start oral meds today  --PRN Tylenol for headache  -- CTA H&N done- cannot rule out dissection of R vertebral artery as no prior studies.  Will consult Neurology for further advice as to imaging, etc.  May ultimately need Neurosurgery.   --CT head unremarkable  --CT chest also unremarkable    New diagnosis T2DM  --HbA1C 7.5%, will need oral hypoglycemics at d/c  --SSI for now    Elevated creatinine  --creatinine 1.48 on admission, baseline unknown  -- better this am at 1.21  --bmp in the am    Kori Panchal MD  21      Electronically signed by Kori Panchal MD at 21 1112          Consult Notes (last 48 hours) (Notes from 21 0748 through 21 0748)      Malik Yoon MD at 21 1201      Consult Orders    1. Inpatient Neurology Consult Stroke [[id number]] ordered by Kori Panchal MD at 21 0806               Stroke Consult Note    Patient Name: Kelvin French   MRN: [id number]  Age: 75 y.o.  Sex: male  : 1945    Primary Care Physician: Provider, No Known  Referring Physician:  No ref. provider found    Handedness: Right  Race:       Chief Complaint/Reason for Consultation: lightheadedness x 1 week, right vertebral artery abnormalities on CTA.     Subjective      HPI: Mr. French is a 75 year old male with no known medical diagnosis, who does not regularly see a physician, who presented to the Emergency Department this morning with complaints of headache and ear pain x 2 weeks and lightheadedness for approximately 1 week.  He reports that his lightheadedness has been intermittent, therefore he delayed initial evaluation of his symptoms however when they became constant he came to the emergency department for further evaluation.  Upon arrival to the ED he was noted to be extremely hypertensive with a blood pressure of  248/135.  He was started on a Cardene drip and admitted to the hospital service for further evaluation.  Advanced cerebral imaging was obtained, including a CTA head and neck, which was concerning for a questionable right vertebral artery dissection therefore a stroke neurology was consulted.    The patient states that he has not seen a physician in many years and does not take any prescription medications.  He denies experiencing any unilateral weakness, numbness, gait disturbance, dysarthria, or expressive aphasia.    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  He has been told that he has glaucoma, but he has never seen ophthalmology.    Last Known Normal Date/Time: 1 week ago    Review of Systems   Constitutional: Negative.    HENT: Positive for ear pain.    Eyes: Positive for visual disturbance (Chronic left eye blindness and decreased vision in right eye). Negative for photophobia.   Respiratory: Negative.    Cardiovascular: Negative.    Gastrointestinal: Negative.    Endocrine: Negative.    Genitourinary: Negative.    Musculoskeletal: Positive for arthralgias. Negative for gait problem.   Skin: Negative.    Neurological: Positive for light-headedness and headaches. Negative for facial asymmetry, speech difficulty, weakness and numbness.   Hematological: Negative.    Psychiatric/Behavioral: Negative.       Past Medical History:   Diagnosis Date   • Peptic ulceration      No past surgical history on file.  No family history on file.  Social History     Socioeconomic History   • Marital status: Single     Spouse name: Not on file   • Number of children: Not on file   • Years of education: Not on file   • Highest education level: Not on file   Tobacco Use   • Smoking status: Former Smoker   • Smokeless tobacco: Never Used   • Tobacco comment: quit in the 1980's   Substance and Sexual Activity   • Alcohol use: Not Currently     Comment:  quit 5 years ago   • Drug use: Never   • Sexual activity: Defer     No Known Allergies  Prior to Admission medications    Medication Sig Start Date End Date Taking? Authorizing Provider   acetaminophen (TYLENOL) 325 MG tablet Take 650 mg by mouth Every 6 (Six) Hours As Needed for Mild Pain .   Yes Provider, MD Memo        Objective     Temp:  [97.6 °F (36.4 °C)-98.4 °F (36.9 °C)] 97.6 °F (36.4 °C)  Heart Rate:  [80-96] 89  Resp:  [14-16] 15  BP: (141-248)/() 181/111     Neurological Exam  Mental Status  Awake, alert and oriented to person, place and time.Alert. Oriented to person, place, time and situation. Recent and remote memory are intact. Speech is normal. Language is fluent with no aphasia. Attention and concentration are normal. Fund of knowledge is appropriate for level of education.    Cranial Nerves  CN II: Left visual acuity: no light perception. Chronic blindness x 5 years. In the right eye he is unable to see on the right half of the vision..  CN III, IV, VI: Extraocular movements intact bilaterally. Pupils equal round and reactive to light bilaterally.  CN V: Facial sensation is normal.  CN VII: Full and symmetric facial movement.  CN VIII: Hearing is intact.  CN IX, X: Palate elevates symmetrically  CN XI: Shoulder shrug strength is normal.  CN XII: Tongue midline without atrophy or fasciculations.    Motor  Normal muscle bulk throughout. No fasciculations present. Normal muscle tone. Strength is 5/5 throughout all four extremities.    Sensory  Light touch is normal in upper and lower extremities.     Reflexes  Deep tendon reflexes are 2+ and symmetric in all four extremities with downgoing toes bilaterally.    Coordination  Finger-to-nose, rapid alternating movements and heel-to-shin normal bilaterally without dysmetria.    Gait  Not assessed.      Physical Exam  Vitals and nursing note reviewed.   Constitutional:       General: He is not in acute distress.     Appearance: Normal  appearance. He is not toxic-appearing.   HENT:      Head: Normocephalic and atraumatic.      Mouth/Throat:      Mouth: Mucous membranes are moist.      Pharynx: Oropharynx is clear.   Eyes:      Extraocular Movements: Extraocular movements intact.      Pupils: Pupils are equal, round, and reactive to light.   Cardiovascular:      Rate and Rhythm: Normal rate and regular rhythm.      Comments: Occasional PACs  Pulmonary:      Effort: Pulmonary effort is normal.      Breath sounds: Normal breath sounds.   Abdominal:      General: There is no distension.   Musculoskeletal:         General: Normal range of motion.      Cervical back: Normal range of motion.   Skin:     General: Skin is warm and dry.   Neurological:      General: No focal deficit present.      Mental Status: He is alert and oriented to person, place, and time.      Coordination: Coordination is intact.      Deep Tendon Reflexes: Strength normal and reflexes are normal and symmetric.   Psychiatric:         Mood and Affect: Mood normal.         Speech: Speech normal.         Behavior: Behavior normal.       Acute Stroke Data    Alteplase (tPA) Inclusion / Exclusion Criteria    Time: 12:02 EDT  Person Administering Scale: Юлия Duran RN Extender  Inclusion Criteria  [x]   18 years of age or greater   []   Onset of symptoms < 4.5 hours before beginning treatment (stroke onset = time patient was last seen well or without symptoms).   []   Diagnosis of acute ischemic stroke causing measurable disabling deficit (Complete Hemianopia, Any Aphasia, Visual or Sensory Extinction, Any weakness limiting sustained effort against gravity)   []   Any remaining deficit considered potentially disabling in view of patient and practitioner   Exclusion criteria (Do not proceed with Alteplase if any are checked under exclusion criteria)  [x]   Onset unknown or GREATER than 4.5 hours   []   ICH on CT/MRI   []   CT demonstrates hypodensity representing acute or  subacute infarct   []   Significant head trauma or prior stroke in the previous 3 months   []   Symptoms suggestive of subarachnoid hemorrhage   []   History of un-ruptured intracranial aneurysm GREATER than 10 mm   []   Recent intracranial or intraspinal surgery within the last 3 months   []   Arterial puncture at a non-compressible site in the previous 7 days   []   Active internal bleeding   []   Acute bleeding tendency   []   Platelet count LESS than 100,000 for known hematological diseases such as leukemia, thrombocytopenia or chronic cirrhosis   []   Current use of anticoagulant with INR GREATER than 1.7 or PT GREATER than 15 seconds, aPTT GREATER than 40 seconds   []   Heparin received within 48 hours, resulting in abnormally elevated aPTT GREATER than upper limit of normal   []   Current use of direct thrombin inhibitors or direct factor Xa inhibitors in the past 48 hours   []   Elevated blood pressure refractory to treatment (systolic GREATER than 185 mm/Hg or diastolic  GREATER than 110 mm/Hg   []   Suspected infective endocarditis and aortic arch dissection   []   Current use of therapeutic treatment dose of low-molecular-weight heparin (LMWH) within the previous 24 hours   []   Structural GI malignancy or bleed   Relative exclusion for all patients  []   Only minor non-disabling symptoms   []   Pregnancy   []   Seizure at onset with postictal residual neurological impairments   []   Major surgery or previous trauma within past 14 days   []   History of previous spontaneous ICH, intracranial neoplasm, or AV malformation   []   Postpartum (within previous 14 days)   []   Recent GI or urinary tract hemorrhage (within previous 21 days)   []   Recent acute MI (within previous 3 months)   []   History of un-ruptured intracranial aneurysm LESS than 10 mm   []   History of ruptured intracranial aneurysm   []   Blood glucose LESS than 50 mg/dL (2.7 mmol/L)   []   Dural puncture within the last 7 days   []   Known  GREATER than 10 cerebral microbleeds   Additional exclusions for patients with symptoms onset between 3 and 4.5 hours.  []   Age > 80.   []   On any anticoagulants regardless of INR  >>> Warfarin (Coumadin), Heparin, Enoxaparin (Lovenox), fondaparinux (Arixtra), bivalirudin (Angiomax), Argatroban, dabigatran (Pradaxa), rivaroxaban (Xarelto), or apixaban (Eliquis)   []   Severe stroke (NIHSS > 25).   []   History of BOTH diabetes and previous ischemic stroke.   []   The risks and benefits have been discussed with the patient or family related to the administration of IV Alteplase for stroke symptoms.   []   I have discussed and reviewed the patient's case and imaging with the attending prior to IV Alteplase.   N/A Time Alteplase administered       Hospital Meds:  Scheduled- aspirin, 81 mg, Oral, Daily  atorvastatin, 80 mg, Oral, Nightly  carvedilol, 3.125 mg, Oral, BID With Meals  clopidogrel, 75 mg, Oral, Daily  insulin lispro, 0-7 Units, Subcutaneous, TID AC  sodium chloride, 10 mL, Intravenous, Q12H      Infusions- nitroglycerin, 5-200 mcg/min, Last Rate: Stopped (06/17/21 0402)       PRNs- •  acetaminophen **OR** acetaminophen **OR** acetaminophen  •  dextrose  •  dextrose  •  glucagon (human recombinant)  •  melatonin  •  [COMPLETED] Insert peripheral IV **AND** sodium chloride  •  sodium chloride    Functional Status Prior to Current Stroke/[facility] Score: 0    NIH Stroke Scale  Time: 12:02 EDT  Person Administering Scale: Юлия Duran RN    1a  Level of consciousness: 0=alert; keenly responsive   1b. LOC questions:  0=Performs both tasks correctly   1c. LOC commands: 0=Performs both tasks correctly   2.  Best Gaze: 0=normal   3.  Visual: 3=Bilateral hemianopia (blind including cortical blindness) - chronic   4. Facial Palsy: 0=Normal symmetric movement   5a.  Motor left arm: 0=No drift, limb holds 90 (or 45) degrees for full 10 seconds   5b.  Motor right arm: 0=No drift, limb holds 90 (or 45) degrees  for full 10 seconds   6a. motor left le=No drift, limb holds 90 (or 45) degrees for full 10 seconds   6b  Motor right le=No drift, limb holds 90 (or 45) degrees for full 10 seconds   7. Limb Ataxia: 0=Absent   8.  Sensory: 0=Normal; no sensory loss   9. Best Language:  0=No aphasia, normal   10. Dysarthria: 0=Normal   11. Extinction and Inattention: 0=No abnormality    Total:   3 (baseline)       Results Reviewed:  I have personally reviewed current lab, radiology, and data    CT head shows no acute changes, no hemorrhage.  CTA head/neck - chronic right vertebral stenosis/atherosclerosis and bilateral intracranial and extracranial stenosis.  A1c 7.50  Creatinine 1.21, GFR 71; improved from yesterday  AST 58       Assessment/Plan:      1. Hypertensive urgency.  Patient symptoms of lightheadedness/dizziness, likely secondary to hypertension.  He presented with a blood pressure of 248/135.  Aggressive blood pressure control with goal SBP <140.  Hospitalist managing the same.    2. Cerebral atherosclerosis.  CTA reveals diffusely atherosclerotic right vertebral artery along with bilateral intracranial and extracranial atherosclerosis.  Will initiate the patient on high intensity statin in addition to dual antiplatelet therapy (ASA 81 mg and Plavix 75 mg) x90 days then decrease to  mg monotherapy for secondary stroke prevention.      3. Type 2 diabetes, newly diagnosed.  A1c on admission is 7.50; goal <7.  Management per hospitalist.  Will have diabetes educator see the patient for new diagnosis.  Patient will need close blood glucose monitoring by PCP.     4. Activity as tolerated.      5. Heart healthy, diabetic diet.  Will have dietitian meet with patient to discuss new dietary recommendations.     6. Case management consult for discharge needs.  Patient will need assistance in establishing a primary care provider to manage his multiple comorbidities.    From a neurological standpoint the patient can  be discharged when medically ready.  Stroke neurology will sign off at this time.  Please call for any questions or concerns.  Thank you for this consult.    Plan of care was discussed with the patient and the hospitalist.    Юлия Duran RN Extender/Stroke Navigator    I have personally seen, interviewed and examined the patient and verified all the key components of the history, physical examination, assessment and plan with Юлия Duran RN Extender and reviewed the note, which reflects my changes and contributions.    Malik Yoon MD  6/17/2021              Electronically signed by Malik Yoon MD at 06/17/21 0728

## 2021-06-19 ENCOUNTER — READMISSION MANAGEMENT (OUTPATIENT)
Dept: CALL CENTER | Facility: HOSPITAL | Age: 76
End: 2021-06-19

## 2021-06-19 VITALS
DIASTOLIC BLOOD PRESSURE: 95 MMHG | HEART RATE: 71 BPM | HEIGHT: 69 IN | OXYGEN SATURATION: 94 % | SYSTOLIC BLOOD PRESSURE: 172 MMHG | WEIGHT: 195 LBS | TEMPERATURE: 98 F | BODY MASS INDEX: 28.88 KG/M2 | RESPIRATION RATE: 16 BRPM

## 2021-06-19 PROBLEM — I16.0 HYPERTENSIVE URGENCY: Status: RESOLVED | Noted: 2021-06-17 | Resolved: 2021-06-19

## 2021-06-19 LAB
ANION GAP SERPL CALCULATED.3IONS-SCNC: 13 MMOL/L (ref 5–15)
BUN SERPL-MCNC: 22 MG/DL (ref 8–23)
BUN/CREAT SERPL: 15.9 (ref 7–25)
CALCIUM SPEC-SCNC: 10.3 MG/DL (ref 8.6–10.5)
CHLORIDE SERPL-SCNC: 100 MMOL/L (ref 98–107)
CO2 SERPL-SCNC: 23 MMOL/L (ref 22–29)
CREAT SERPL-MCNC: 1.38 MG/DL (ref 0.76–1.27)
DEPRECATED RDW RBC AUTO: 44.8 FL (ref 37–54)
ERYTHROCYTE [DISTWIDTH] IN BLOOD BY AUTOMATED COUNT: 14.8 % (ref 12.3–15.4)
GFR SERPL CREATININE-BSD FRML MDRD: 61 ML/MIN/1.73
GLUCOSE BLDC GLUCOMTR-MCNC: 119 MG/DL (ref 70–130)
GLUCOSE BLDC GLUCOMTR-MCNC: 132 MG/DL (ref 70–130)
GLUCOSE SERPL-MCNC: 134 MG/DL (ref 65–99)
HCT VFR BLD AUTO: 43.6 % (ref 37.5–51)
HGB BLD-MCNC: 13.9 G/DL (ref 13–17.7)
MCH RBC QN AUTO: 26.3 PG (ref 26.6–33)
MCHC RBC AUTO-ENTMCNC: 31.9 G/DL (ref 31.5–35.7)
MCV RBC AUTO: 82.6 FL (ref 79–97)
PLATELET # BLD AUTO: 262 10*3/MM3 (ref 140–450)
PMV BLD AUTO: 10.5 FL (ref 6–12)
POTASSIUM SERPL-SCNC: 4.5 MMOL/L (ref 3.5–5.2)
RBC # BLD AUTO: 5.28 10*6/MM3 (ref 4.14–5.8)
SODIUM SERPL-SCNC: 136 MMOL/L (ref 136–145)
WBC # BLD AUTO: 6.24 10*3/MM3 (ref 3.4–10.8)

## 2021-06-19 PROCEDURE — 99239 HOSP IP/OBS DSCHRG MGMT >30: CPT | Performed by: HOSPITALIST

## 2021-06-19 PROCEDURE — 80048 BASIC METABOLIC PNL TOTAL CA: CPT | Performed by: HOSPITALIST

## 2021-06-19 PROCEDURE — 85027 COMPLETE CBC AUTOMATED: CPT | Performed by: HOSPITALIST

## 2021-06-19 PROCEDURE — 82962 GLUCOSE BLOOD TEST: CPT

## 2021-06-19 RX ORDER — ATORVASTATIN CALCIUM 80 MG/1
80 TABLET, FILM COATED ORAL NIGHTLY
Qty: 30 TABLET | Refills: 0 | Status: SHIPPED | OUTPATIENT
Start: 2021-06-19 | End: 2021-07-26 | Stop reason: SDUPTHER

## 2021-06-19 RX ORDER — AMLODIPINE BESYLATE 10 MG/1
10 TABLET ORAL
Qty: 30 TABLET | Refills: 0 | Status: SHIPPED | OUTPATIENT
Start: 2021-06-20 | End: 2021-07-26 | Stop reason: SDUPTHER

## 2021-06-19 RX ORDER — CLOPIDOGREL BISULFATE 75 MG/1
75 TABLET ORAL DAILY
Qty: 30 TABLET | Refills: 0 | Status: SHIPPED | OUTPATIENT
Start: 2021-06-20 | End: 2021-07-30 | Stop reason: SDUPTHER

## 2021-06-19 RX ORDER — CARVEDILOL 25 MG/1
25 TABLET ORAL EVERY 12 HOURS SCHEDULED
Qty: 60 TABLET | Refills: 0 | Status: SHIPPED | OUTPATIENT
Start: 2021-06-19 | End: 2021-08-05 | Stop reason: SDUPTHER

## 2021-06-19 RX ORDER — CARVEDILOL 12.5 MG/1
12.5 TABLET ORAL ONCE
Status: COMPLETED | OUTPATIENT
Start: 2021-06-19 | End: 2021-06-19

## 2021-06-19 RX ORDER — ASPIRIN 81 MG/1
81 TABLET ORAL DAILY
Qty: 30 TABLET | Refills: 0 | Status: SHIPPED | OUTPATIENT
Start: 2021-06-20 | End: 2021-07-26 | Stop reason: SDUPTHER

## 2021-06-19 RX ORDER — CARVEDILOL 12.5 MG/1
25 TABLET ORAL EVERY 12 HOURS SCHEDULED
Status: DISCONTINUED | OUTPATIENT
Start: 2021-06-19 | End: 2021-06-19 | Stop reason: HOSPADM

## 2021-06-19 RX ORDER — METFORMIN HYDROCHLORIDE 500 MG/1
500 TABLET, EXTENDED RELEASE ORAL
Qty: 30 TABLET | Refills: 0 | Status: SHIPPED | OUTPATIENT
Start: 2021-06-19 | End: 2021-07-26 | Stop reason: SDUPTHER

## 2021-06-19 RX ADMIN — AMLODIPINE BESYLATE 10 MG: 5 TABLET ORAL at 08:06

## 2021-06-19 RX ADMIN — SODIUM CHLORIDE, PRESERVATIVE FREE 10 ML: 5 INJECTION INTRAVENOUS at 08:06

## 2021-06-19 RX ADMIN — CARVEDILOL 12.5 MG: 12.5 TABLET, FILM COATED ORAL at 08:06

## 2021-06-19 RX ADMIN — CARVEDILOL 12.5 MG: 12.5 TABLET, FILM COATED ORAL at 10:51

## 2021-06-19 RX ADMIN — CLOPIDOGREL BISULFATE 75 MG: 75 TABLET ORAL at 08:06

## 2021-06-19 RX ADMIN — ASPIRIN 81 MG: 81 TABLET, COATED ORAL at 08:06

## 2021-06-19 NOTE — OUTREACH NOTE
Prep Survey      Responses   [facility] patient discharged from?  [facility]   Is LACE score < 7 ?  No   Emergency Room discharge w/ pulse ox?  No   Eligibility  [facility]   Date of Admission  06/16/21   Date of Discharge  06/19/21   Discharge Disposition  Home or Self Care   Discharge diagnosis  Cerebral atherosclerosis ,  new onset DM2   Does the patient have one of the following disease processes/diagnoses(primary or secondary)?  Other   Does the patient have Home health ordered?  No   Is there a DME ordered?  No   Prep survey completed?  Yes          Rosalba Montemayor RN

## 2021-06-19 NOTE — DISCHARGE SUMMARY
[facility] Medicine Services  Clinical Decision Unit  DISCHARGE SUMMARY    Patient Name: Kelvin French  : 1945  MRN: [id number]    Admission Date and Time: 2021 10:17 PM  Discharge Date and Time:  21    Primary Care Physician: Joshua Youssef APRN    Hospital Course     Active Hospital Problems    Diagnosis  POA   • Cerebral atherosclerosis [I67.2]  Yes   • Elevated serum creatinine [R79.89]  Yes   • New onset type 2 diabetes mellitus (CMS/HCC) [E11.9]  Yes      Resolved Hospital Problems    Diagnosis Date Resolved POA   • **Hypertensive urgency [I16.0] 2021 Yes          Brief CDU Course:  Kelvin French is a 75 y.o. male with hx of a bleeding ulcer but otherwise no known past medical history as he has not seen a doctor since , who presents due to headache and ear pain. He is found to have elevated /137 on arrival. Labs showed Cr 1.48, otherwise negative troponins, normal proBNP. CT head without contrast was negative. CTA chest no PE, but small hiatal hernia and hepatic steatosis noted. CTA head/neck revealed diffusely atherosclerotic right vertebral artery along with bilateral intracranial and extracranial atherosclerosis. Neurology was consulted. BP managed with Nitro drip and we have started him on oral medications.      Hypertensive urgency  --Was overly sensitive to Cardene drip, so switched to Nitro drip. Goal  or less for now. Have started Coreg, titrated up to 25 mg BID. Also started Norvasc 10 mg daily.      Cerebral atherosclerosis  --Neurology has evaluated. Recommending DAPT x 90 days, then full dose ASA monotherapy thereafter for stroke prevention. Continue on high intensity statin as well.     Elevated creatinine - suspect CKD  --Cr 1.48, improved to 1.21, now 1.38.  --He may have underlying CKD, no prior labs for comparison however. Likely secondary to uncontrolled/untreated hypertension vs untreated diabetes.   --Will need close  "monitoring as outpatient.  --Need to avoid ACE/ARB for now as choices for antihypertensive agents because of this.     New diagnosis type 2 diabetes  --HbA1c 7.5%. He has been told in the past that he is \"borderline\" diabetic.   --Discussed with patient, he is agreeable to starting on Metformin at discharge--plan to start 500 mg daily and increase every 7 days as tolerated. Discussed possible GI side effects.   --DM educator has seen.      Key Discharge Recommendations:  F/U with PCP as scheduled--new PCP appointment 6/24/21    Discharge Evaluation     Vital Signs:   Temp:  [98.2 °F (36.8 °C)-99.5 °F (37.5 °C)] 98.6 °F (37 °C)  Heart Rate:  [66-96] 79  Resp:  [16] 16  BP: (138-205)/() 150/87    Physical Exam:  Gen-no acute distress  HENT-NCAT, mucous membranes moist  CV-RRR, S1 S2 normal, no m/r/g  Resp-CTAB, no wheezes or rales  Abd-soft, NT, ND, +BS  Ext-no edema  Neuro-A&Ox3, no focal deficits  Skin-no rashes  Psych-appropriate mood      Pertinent  and/or Most Recent Results     Result Review:  I have personally reviewed the results from the time of this admission to 06/19/21 1:10 PM EDT and agree with these findings:  [x]  Laboratory  []  Microbiology  []  Radiology  []  EKG/Telemetry   []  Cardiology/Vascular   []  Pathology  []  Old records  []  Other:  Most notable findings include:   LAB RESULTS:      Lab 06/19/21 0319 06/18/21  0332 06/17/21  0606 06/16/21  2346   WBC 6.24 4.29 4.09 5.44   HEMOGLOBIN 13.9 13.7 13.7 13.6   HEMATOCRIT 43.6 43.5 43.4 42.8   PLATELETS 262 248 241 245   NEUTROS ABS  --   --   --  2.34   IMMATURE GRANS (ABS)  --   --   --  0.00   LYMPHS ABS  --   --   --  2.49   MONOS ABS  --   --   --  0.42   EOS ABS  --   --   --  0.14   MCV 82.6 85.1 84.3 83.3         Lab 06/19/21  0319 06/18/21  0332 06/17/21  0606 06/16/21  2346   SODIUM 136 139 136 140   POTASSIUM 4.5 4.2 4.2 3.9   CHLORIDE 100 100 100 101   CO2 23.0 31.0* 26.0 29.0   ANION GAP 13.0 8.0 10.0 10.0   BUN 22 20 19 22 "   CREATININE 1.38* 1.42* 1.21 1.48*   GLUCOSE 134* 147* 130* 131*   CALCIUM 10.3 10.3 9.8 10.4   HEMOGLOBIN A1C  --   --  7.50*  --          Lab 06/16/21  2346   TOTAL PROTEIN 7.5   ALBUMIN 4.50   GLOBULIN 3.0   ALT (SGPT) 28   AST (SGOT) 58*   BILIRUBIN 0.6   ALK PHOS 58         Lab 06/16/21  2346   PROBNP 75.9   TROPONIN T <0.010                   Microbiology Results (last 10 days)     Procedure Component Value - Date/Time    COVID PRE-OP / PRE-PROCEDURE SCREENING ORDER (NO ISOLATION) - Swab, Nasopharynx [[id number]]  (Normal) Collected: 06/17/21 0400    Lab Status: Final result Specimen: Swab from Nasopharynx Updated: 06/17/21 0459    Narrative:      The following orders were created for panel order COVID PRE-OP / PRE-PROCEDURE SCREENING ORDER (NO ISOLATION) - Swab, Nasopharynx.  Procedure                               Abnormality         Status                     ---------                               -----------         ------                     COVID-19 and FLU A/B PCR...[[id number]]  Normal              Final result                 Please view results for these tests on the individual orders.    COVID-19 and FLU A/B PCR - Swab, Nasopharynx [[id number]]  (Normal) Collected: 06/17/21 0400    Lab Status: Final result Specimen: Swab from Nasopharynx Updated: 06/17/21 0459     COVID19 Not Detected     Influenza A PCR Not Detected     Influenza B PCR Not Detected    Narrative:      Fact sheet for providers: https://www.fda.gov/media/815539/download    Fact sheet for patients: https://www.fda.gov/media/581102/download    Test performed by PCR.    Eosinophil Smear - Urine, Urine, Clean Catch [[id number]]  (Normal) Collected: 06/17/21 0357    Lab Status: Final result Specimen: Urine, Clean Catch Updated: 06/17/21 0505     Eosinophil Smear 0 % EOS/100 Cells     Narrative:      No eosinophil seen          CT Head Without Contrast    Result Date: 6/17/2021  CT Head WO HISTORY: Headache and hypertension today. TECHNIQUE:  Axial unenhanced head CT with multiplanar reformats. Radiation dose reduction techniques included automated exposure control or exposure modulation based on body size. Count of known CT and cardiac nuc med studies performed in previous 12 months: 0. COMPARISON: None. FINDINGS: Ventricular size and configuration are normal. No acute infarct or hemorrhage is identified. There are no masses. There is no skull fracture.  There is atrophy with chronic small vessel ischemic disease in the white matter. Visualized paranasal sinuses and mastoid air cells are clear. There is a small lipoma near the basilar tip measuring about 7 x 6 x 6 mm in size.     Senescent changes without acute abnormality. Signer Name: Osmar Johnson MD  Signed: 6/17/2021 12:58 AM  Workstation [facility]: [facility]  [facility]    CT Angiogram Neck    Result Date: 6/17/2021  CTA Head, CTA Neck INDICATION: Hypertension and headache. TECHNIQUE: CT angiogram of the head and neck with and without IV contrast. 3-D reconstructions were obtained and reviewed.  Evaluation for a significant carotid arterial stenosis is based on the NASCET criteria. Radiation dose reduction techniques included automated exposure control or exposure modulation based on body size. Count of known CT and cardiac nuc med studies performed in previous 12 months: 1. COMPARISON: Head CT same day FINDINGS: CTA neck:  Please see chest CTA report dictated separately. The left vertebral artery has a separate origin from the aortic arch as an anatomic variant. The left vertebral artery is widely patent and forms a normal caliber basilar artery. The right vertebral artery is markedly diminutive in size and overall poorly characterized. This may very well be a chronic finding as there are no comparison studies. However, an acute right vertebral artery abnormality such as a dissection cannot be excluded. The carotid vessels are tortuous. However, the carotid arteries are  widely patent on both sides of the neck. There is no plaque or evidence of stenosis on either side. CTA head:  The basilar artery is narrow in caliber but widely patent. This appears to be secondary to an anterior dominant circulation with bilateral fetal origin PCAs. No intracranial flow-limiting stenosis or focal vessel cut off is seen. There is no aneurysm. Major dural venous sinuses are patent. No pathologic enhancement is identified in the brain.     1. The carotid arteries in the neck are normal and widely patent. The left vertebral artery is normal. 2. The right vertebral artery is diminutive in caliber throughout its length and poorly characterized. This may be a chronic finding, possibly congenital. Acute abnormality such as dissection unfortunately is not excluded without comparison studies. 3. Anterior dominant intracranial circulation with bilateral fetal origin PCAs. No intracranial flow limiting stenosis or vessel cutoff is seen. There is no aneurysm. Signer Name: Osmar Johnson MD  Signed: 6/17/2021 3:28 AM  Workstation Name: [facility]  [facility]    CT Angiogram Chest With & Without Contrast    Result Date: 6/17/2021  CT ANGIOGRAM CHEST W WO CONTRAST INDICATION: Chest pain. Severe hypertension. TECHNIQUE: CT angiogram of the chest with and without IV contrast. 3-D reconstructions were obtained and reviewed.   Radiation dose reduction techniques included automated exposure control or exposure modulation based on body size. Count of known CT and cardiac nuc med studies performed in previous 12 months: 1. COMPARISON: None available. FINDINGS: There is no aortic aneurysm or aortic dissection to the level of the renal arteries. The great vessel origins are normal. The left vertebral artery has a separate origin from the aortic arch. No pulmonary embolism is identified. There is no pleural or pericardial effusion. There is no adenopathy. Upper abdominal images show hepatic  steatosis. Small hiatal hernia is present. The lungs are clear. No CT findings present to indicate pneumonia. Note: CT may be negative in the earliest stages of COVID-19.     1. No aortic aneurysm or aortic dissection. No pulmonary embolism. 2. No acute findings in the chest. 3. Small hiatal hernia. 4. Hepatic steatosis. Signer Name: Osmar Johnson MD  Signed: 6/17/2021 3:20 AM  Workstation Name: [facility]  [facility]    XR Chest 1 View    Result Date: 6/17/2021  CR Chest 1 Vw INDICATION: Hypertension and headache. COMPARISON:  None available. FINDINGS: Single portable AP view(s) of the chest.  There is mild cardiomegaly. Mediastinal contours are otherwise normal. Pulmonary vascularity is normal. The lungs are clear. No pneumothorax is seen. There is mild eventration of the right hemidiaphragm.     No acute cardiopulmonary findings. Signer Name: Osmar Johnson MD  Signed: 6/17/2021 12:00 AM  Workstation Name: [facility]    CT Angiogram Head    Result Date: 6/17/2021  CTA Head, CTA Neck INDICATION: Hypertension and headache. TECHNIQUE: CT angiogram of the head and neck with and without IV contrast. 3-D reconstructions were obtained and reviewed.  Evaluation for a significant carotid arterial stenosis is based on the NASCET criteria. Radiation dose reduction techniques included automated exposure control or exposure modulation based on body size. Count of known CT and cardiac nuc med studies performed in previous 12 months: 1. COMPARISON: Head CT same day FINDINGS: CTA neck:  Please see chest CTA report dictated separately. The left vertebral artery has a separate origin from the aortic arch as an anatomic variant. The left vertebral artery is widely patent and forms a normal caliber basilar artery. The right vertebral artery is markedly diminutive in size and overall poorly characterized. This may very well be a chronic finding as there are no  comparison studies. However, an acute right vertebral artery abnormality such as a dissection cannot be excluded. The carotid vessels are tortuous. However, the carotid arteries are widely patent on both sides of the neck. There is no plaque or evidence of stenosis on either side. CTA head:  The basilar artery is narrow in caliber but widely patent. This appears to be secondary to an anterior dominant circulation with bilateral fetal origin PCAs. No intracranial flow-limiting stenosis or focal vessel cut off is seen. There is no aneurysm. Major dural venous sinuses are patent. No pathologic enhancement is identified in the brain.     1. The carotid arteries in the neck are normal and widely patent. The left vertebral artery is normal. 2. The right vertebral artery is diminutive in caliber throughout its length and poorly characterized. This may be a chronic finding, possibly congenital. Acute abnormality such as dissection unfortunately is not excluded without comparison studies. 3. Anterior dominant intracranial circulation with bilateral fetal origin PCAs. No intracranial flow limiting stenosis or vessel cutoff is seen. There is no aneurysm. Signer Name: Osmar Johnson MD  Signed: 6/17/2021 3:28 AM  Workstation Name: [facility]  [facility]            Pending Labs     Order Current Status    Immunofixation, Urine - Urine, Clean Catch In process          Discharge Medications and Follow-Up        Discharge Medications      New Medications      Instructions Start Date   amLODIPine 10 MG tablet  Commonly known as: NORVASC   10 mg, Oral, Every 24 Hours Scheduled   Start Date: June 20, 2021     aspirin 81 MG EC tablet   81 mg, Oral, Daily   Start Date: June 20, 2021     atorvastatin 80 MG tablet  Commonly known as: LIPITOR   80 mg, Oral, Nightly      carvedilol 25 MG tablet  Commonly known as: COREG   25 mg, Oral, Every 12 Hours Scheduled      clopidogrel 75 MG tablet  Commonly known as:  PLAVIX   75 mg, Oral, Daily   Start Date: June 20, 2021     metFORMIN  MG 24 hr tablet  Commonly known as: GLUCOPHAGE-XR   500 mg, Oral, Daily With Breakfast         Continue These Medications      Instructions Start Date   acetaminophen 325 MG tablet  Commonly known as: TYLENOL   650 mg, Oral, Every 6 Hours PRN               Future Appointments   Date Time Provider Department Center   6/24/2021  1:30 PM Joshua Youssef APRN [facility]       Additional Instructions for the Follow-ups that You Need to Schedule     Discharge Follow-up with PCP   As directed       Currently Documented PCP:    Provider, No Known    PCP Phone Number:    None     Follow Up Details: patient needs new PCP appt, lives in [place]; 1 week follow up post-hospital discharge, new diagnosis diabetes and HTN                     Time Spent on Discharge: I spent  35 minutes on this discharge activity which included: face-to-face encounter with the patient, reviewing the data in the system, coordination of the care with the nursing staff as well as consultants, documentation, and entering orders.

## 2021-06-19 NOTE — PLAN OF CARE
"VSS on RA, no c/o pain, no c/o NV, pt continues with stated \"buzzing\" noise in ear, no c/o headache, rested well during shift, will continue to monitor  "

## 2021-06-21 ENCOUNTER — TRANSITIONAL CARE MANAGEMENT TELEPHONE ENCOUNTER (OUTPATIENT)
Dept: CALL CENTER | Facility: HOSPITAL | Age: 76
End: 2021-06-21

## 2021-06-21 LAB — INTERPRETATION UR IFE-IMP: NORMAL

## 2021-06-21 NOTE — OUTREACH NOTE
Call Center TCM Note      Responses   [facility] patient discharged from?  [facility]   Does the patient have one of the following disease processes/diagnoses(primary or secondary)?  Other   TCM attempt successful?  Yes   Call start time  1000   Call end time  1001   Discharge diagnosis  Cerebral atherosclerosis ,  new onset DM2   Meds reviewed with patient/caregiver?  Yes   Is the patient having any side effects they believe may be caused by any medication additions or changes?  No   Does the patient have all medications ordered at discharge?  Yes   Is the patient taking all medications as directed (includes completed medication regime)?  Yes   Does the patient have a primary care provider?   Yes   Does the patient have an appointment with their PCP within 7 days of discharge?  Yes   Comments regarding PCP  Hosp [facility] on 6-24-21 with PCP    Has the patient kept scheduled appointments due by today?  N/A   Psychosocial issues?  No   Did the patient receive a copy of their discharge instructions?  Yes   Nursing interventions  Reviewed instructions with patient   What is the patient's perception of their health status since discharge?  Improving   Is the patient/caregiver able to teach back signs and symptoms related to disease process for when to call PCP?  Yes   Is the patient/caregiver able to teach back signs and symptoms related to disease process for when to call 911?  Yes   Is the patient/caregiver able to teach back the hierarchy of who to call/visit for symptoms/problems? PCP, Specialist, Home health nurse, Urgent Care, ED, 911  Yes   If the patient is a current smoker, are they able to teach back resources for cessation?  Not a smoker   TCM call completed?  Yes          Africa Yusuf RN    6/21/2021, 10:01 EDT

## 2021-06-24 ENCOUNTER — LAB (OUTPATIENT)
Dept: LAB | Facility: HOSPITAL | Age: 76
End: 2021-06-24

## 2021-06-24 ENCOUNTER — OFFICE VISIT (OUTPATIENT)
Dept: INTERNAL MEDICINE | Facility: CLINIC | Age: 76
End: 2021-06-24

## 2021-06-24 VITALS
WEIGHT: 202 LBS | HEART RATE: 88 BPM | SYSTOLIC BLOOD PRESSURE: 160 MMHG | BODY MASS INDEX: 29.83 KG/M2 | OXYGEN SATURATION: 98 % | DIASTOLIC BLOOD PRESSURE: 72 MMHG

## 2021-06-24 DIAGNOSIS — Z11.59 ENCOUNTER FOR HEPATITIS C SCREENING TEST FOR LOW RISK PATIENT: ICD-10-CM

## 2021-06-24 DIAGNOSIS — I67.2 CEREBRAL ATHEROSCLEROSIS: ICD-10-CM

## 2021-06-24 DIAGNOSIS — I10 ESSENTIAL HYPERTENSION: ICD-10-CM

## 2021-06-24 DIAGNOSIS — H53.8 BLURRED VISION: ICD-10-CM

## 2021-06-24 DIAGNOSIS — R79.89 ELEVATED SERUM CREATININE: ICD-10-CM

## 2021-06-24 DIAGNOSIS — E11.9 NEW ONSET TYPE 2 DIABETES MELLITUS (HCC): ICD-10-CM

## 2021-06-24 DIAGNOSIS — Z12.5 SCREENING FOR PROSTATE CANCER: ICD-10-CM

## 2021-06-24 DIAGNOSIS — Z09 HOSPITAL DISCHARGE FOLLOW-UP: Primary | ICD-10-CM

## 2021-06-24 LAB
ALBUMIN SERPL-MCNC: 4.6 G/DL (ref 3.5–5.2)
ALBUMIN/GLOB SERPL: 1.6 G/DL
ALP SERPL-CCNC: 52 U/L (ref 39–117)
ALT SERPL W P-5'-P-CCNC: 22 U/L (ref 1–41)
ANION GAP SERPL CALCULATED.3IONS-SCNC: 10.1 MMOL/L (ref 5–15)
AST SERPL-CCNC: 20 U/L (ref 1–40)
BASOPHILS # BLD AUTO: 0.05 10*3/MM3 (ref 0–0.2)
BASOPHILS NFR BLD AUTO: 1.1 % (ref 0–1.5)
BILIRUB SERPL-MCNC: 0.4 MG/DL (ref 0–1.2)
BUN SERPL-MCNC: 34 MG/DL (ref 8–23)
BUN/CREAT SERPL: 21.4 (ref 7–25)
CALCIUM SPEC-SCNC: 9.9 MG/DL (ref 8.6–10.5)
CHLORIDE SERPL-SCNC: 100 MMOL/L (ref 98–107)
CHOLEST SERPL-MCNC: 114 MG/DL (ref 0–200)
CO2 SERPL-SCNC: 23.9 MMOL/L (ref 22–29)
CREAT SERPL-MCNC: 1.59 MG/DL (ref 0.76–1.27)
DEPRECATED RDW RBC AUTO: 43.2 FL (ref 37–54)
EOSINOPHIL # BLD AUTO: 0.03 10*3/MM3 (ref 0–0.4)
EOSINOPHIL NFR BLD AUTO: 0.6 % (ref 0.3–6.2)
ERYTHROCYTE [DISTWIDTH] IN BLOOD BY AUTOMATED COUNT: 13.9 % (ref 12.3–15.4)
GFR SERPL CREATININE-BSD FRML MDRD: 52 ML/MIN/1.73
GLOBULIN UR ELPH-MCNC: 2.8 GM/DL
GLUCOSE SERPL-MCNC: 99 MG/DL (ref 65–99)
HCT VFR BLD AUTO: 43.5 % (ref 37.5–51)
HDLC SERPL-MCNC: 29 MG/DL (ref 40–60)
HGB BLD-MCNC: 13.9 G/DL (ref 13–17.7)
IMM GRANULOCYTES # BLD AUTO: 0.01 10*3/MM3 (ref 0–0.05)
IMM GRANULOCYTES NFR BLD AUTO: 0.2 % (ref 0–0.5)
LDLC SERPL CALC-MCNC: 69 MG/DL (ref 0–100)
LDLC/HDLC SERPL: 2.37 {RATIO}
LYMPHOCYTES # BLD AUTO: 2.03 10*3/MM3 (ref 0.7–3.1)
LYMPHOCYTES NFR BLD AUTO: 43.3 % (ref 19.6–45.3)
MCH RBC QN AUTO: 26.9 PG (ref 26.6–33)
MCHC RBC AUTO-ENTMCNC: 32 G/DL (ref 31.5–35.7)
MCV RBC AUTO: 84.1 FL (ref 79–97)
MONOCYTES # BLD AUTO: 0.46 10*3/MM3 (ref 0.1–0.9)
MONOCYTES NFR BLD AUTO: 9.8 % (ref 5–12)
NEUTROPHILS NFR BLD AUTO: 2.11 10*3/MM3 (ref 1.7–7)
NEUTROPHILS NFR BLD AUTO: 45 % (ref 42.7–76)
NRBC BLD AUTO-RTO: 0 /100 WBC (ref 0–0.2)
PLATELET # BLD AUTO: 270 10*3/MM3 (ref 140–450)
PMV BLD AUTO: 11.1 FL (ref 6–12)
POTASSIUM SERPL-SCNC: 3.9 MMOL/L (ref 3.5–5.2)
PROT SERPL-MCNC: 7.4 G/DL (ref 6–8.5)
PSA SERPL-MCNC: 5.33 NG/ML (ref 0–4)
RBC # BLD AUTO: 5.17 10*6/MM3 (ref 4.14–5.8)
SODIUM SERPL-SCNC: 134 MMOL/L (ref 136–145)
TRIGL SERPL-MCNC: 82 MG/DL (ref 0–150)
VLDLC SERPL-MCNC: 16 MG/DL (ref 5–40)
WBC # BLD AUTO: 4.69 10*3/MM3 (ref 3.4–10.8)

## 2021-06-24 PROCEDURE — 80053 COMPREHEN METABOLIC PANEL: CPT | Performed by: NURSE PRACTITIONER

## 2021-06-24 PROCEDURE — 99496 TRANSJ CARE MGMT HIGH F2F 7D: CPT | Performed by: NURSE PRACTITIONER

## 2021-06-24 PROCEDURE — 80061 LIPID PANEL: CPT | Performed by: NURSE PRACTITIONER

## 2021-06-24 PROCEDURE — 85025 COMPLETE CBC W/AUTO DIFF WBC: CPT

## 2021-06-24 PROCEDURE — 36415 COLL VENOUS BLD VENIPUNCTURE: CPT | Performed by: NURSE PRACTITIONER

## 2021-06-24 PROCEDURE — 86803 HEPATITIS C AB TEST: CPT | Performed by: NURSE PRACTITIONER

## 2021-06-24 PROCEDURE — G0103 PSA SCREENING: HCPCS

## 2021-06-24 RX ORDER — BLOOD SUGAR DIAGNOSTIC
STRIP MISCELLANEOUS
Qty: 100 EACH | Refills: 5 | Status: SHIPPED | OUTPATIENT
Start: 2021-06-24

## 2021-06-24 RX ORDER — NEBULIZER AND COMPRESSOR
1 EACH MISCELLANEOUS DAILY
Qty: 1 EACH | Refills: 0 | Status: SHIPPED | OUTPATIENT
Start: 2021-06-24

## 2021-06-24 RX ORDER — CLONIDINE HYDROCHLORIDE 0.1 MG/1
TABLET ORAL
Qty: 30 TABLET | Refills: 5 | Status: SHIPPED | OUTPATIENT
Start: 2021-06-24 | End: 2021-07-09 | Stop reason: SDUPTHER

## 2021-06-24 RX ORDER — BLOOD-GLUCOSE METER
1 KIT MISCELLANEOUS AS NEEDED
Qty: 1 EACH | Refills: 0 | Status: SHIPPED | OUTPATIENT
Start: 2021-06-24

## 2021-06-24 RX ORDER — LANCETS 30 GAUGE
EACH MISCELLANEOUS
Qty: 100 EACH | Refills: 5 | Status: SHIPPED | OUTPATIENT
Start: 2021-06-24

## 2021-06-24 NOTE — PROGRESS NOTES
Subjective   Chief Complaint   Patient presents with   • Establish Care   • Hospital Follow Up Visit       Kelvin French is a 75 y.o. male here today as a new patient for a hospital follow-up.  Pt was admitted to [facility] 6/16-6/19/21 with hypertensive urgency.  Pt presented to the ER for headache and ear pain.  His blood pressure was found to be 240/137.  His creatinine was elevated at 1.48.  He had a negative cardiac workup.  He was placed on a NTG drip and started on Coreg and Norvasc.  He was also found to have Type 2 Diabetes with an A1c 7.5.  He was started on Metformin.  Pt has not had a regular PCP.  Pt states that he does not have any way of checking his sugars at home.  He has no complaints today.  States he's feeling well. He states that he has been taking his medication as prescribed.    Review of Systems   Constitutional: Negative for activity change, appetite change and fatigue.   HENT: Negative for congestion.    Respiratory: Negative for cough and shortness of breath.    Cardiovascular: Negative for chest pain and leg swelling.   Gastrointestinal: Negative for abdominal pain.   Neurological: Negative for dizziness, weakness and confusion.   Psychiatric/Behavioral: Negative for behavioral problems and decreased concentration.       Past Medical History:   Diagnosis Date   • Diabetes mellitus (CMS/HCC)    • Peptic ulceration      History reviewed. No pertinent surgical history.  History reviewed. No pertinent family history.  Social History     Tobacco Use   Smoking Status Former Smoker   Smokeless Tobacco Never Used   Tobacco Comment    quit in the 1980's      Social History     Substance and Sexual Activity   Alcohol Use Not Currently    Comment: quit 5 years ago      Current Outpatient Medications on File Prior to Visit   Medication Sig   • amLODIPine (NORVASC) 10 MG tablet Take 1 tablet by mouth Daily.   • aspirin 81 MG EC tablet Take 1 tablet by mouth Daily.   • atorvastatin (LIPITOR)  80 MG tablet Take 1 tablet by mouth Every Night.   • carvedilol (COREG) 25 MG tablet Take 1 tablet by mouth Every 12 (Twelve) Hours.   • clopidogrel (PLAVIX) 75 MG tablet Take 1 tablet by mouth Daily.   • metFORMIN ER (GLUCOPHAGE-XR) 500 MG 24 hr tablet Take 1 tablet by mouth Daily With Breakfast.   • [DISCONTINUED] acetaminophen (TYLENOL) 325 MG tablet Take 650 mg by mouth Every 6 (Six) Hours As Needed for Mild Pain .     No current facility-administered medications on file prior to visit.     No Known Allergies    Objective   Vitals:    06/24/21 1339   BP: 160/72   Pulse: 88   SpO2: 98%   Weight: 91.6 kg (202 lb)     Body mass index is 29.83 kg/m².    Physical Exam  Vitals and nursing note reviewed.   HENT:      Head: Normocephalic.   Eyes:      Pupils: Pupils are equal, round, and reactive to light.   Cardiovascular:      Rate and Rhythm: Normal rate and regular rhythm.      Pulses: Normal pulses.      Heart sounds: Normal heart sounds.   Pulmonary:      Effort: Pulmonary effort is normal.      Breath sounds: Normal breath sounds.   Skin:     General: Skin is warm and dry.      Capillary Refill: Capillary refill takes less than 2 seconds.   Neurological:      General: No focal deficit present.      Mental Status: He is alert and oriented to person, place, and time.   Psychiatric:         Mood and Affect: Mood normal.         Behavior: Behavior normal.         Thought Content: Thought content normal.         Assessment/Plan   Problem List Items Addressed This Visit        Endocrine and Metabolic    New onset type 2 diabetes mellitus (CMS/HCC)    Relevant Medications    glucose monitor monitoring kit    glucose blood (Glucose Meter Test) test strip    Lancets misc       Genitourinary and Reproductive     Elevated serum creatinine    Relevant Orders    Comprehensive Metabolic Panel       Neuro    Cerebral atherosclerosis      Other Visit Diagnoses     Hospital discharge follow-up    -  Primary    Essential  hypertension        Relevant Medications    cloNIDine (CATAPRES) 0.1 MG tablet    Other Relevant Orders    CBC w AUTO Differential    Lipid Panel    Blurred vision        Encounter for hepatitis C screening test for low risk patient        Relevant Orders    Hepatitis C Antibody    Screening for prostate cancer        Relevant Orders    PSA Screen         D/C summary reviewed  Pt has been contacted by TCM nurse   add Clonidine for better BP control  Plavix/ASA for 90 days to avoid stroke and then will resume ASA only  Has eye appt in Aug  Schedule Medicare Wellness  Check lipids and kidney function today    Current Outpatient Medications:   •  amLODIPine (NORVASC) 10 MG tablet, Take 1 tablet by mouth Daily., Disp: 30 tablet, Rfl: 0  •  aspirin 81 MG EC tablet, Take 1 tablet by mouth Daily., Disp: 30 tablet, Rfl: 0  •  atorvastatin (LIPITOR) 80 MG tablet, Take 1 tablet by mouth Every Night., Disp: 30 tablet, Rfl: 0  •  carvedilol (COREG) 25 MG tablet, Take 1 tablet by mouth Every 12 (Twelve) Hours., Disp: 60 tablet, Rfl: 0  •  clopidogrel (PLAVIX) 75 MG tablet, Take 1 tablet by mouth Daily., Disp: 30 tablet, Rfl: 0  •  metFORMIN ER (GLUCOPHAGE-XR) 500 MG 24 hr tablet, Take 1 tablet by mouth Daily With Breakfast., Disp: 30 tablet, Rfl: 0  •  Blood Pressure Monitoring (Adult Blood Pressure Cuff Lg) kit, 1 each Daily., Disp: 1 each, Rfl: 0  •  cloNIDine (CATAPRES) 0.1 MG tablet, Take one tablet by mouth daily., Disp: 30 tablet, Rfl: 5  •  glucose blood (Glucose Meter Test) test strip, Check blood sugars 3 times daily as needed., Disp: 100 each, Rfl: 5  •  glucose monitor monitoring kit, 1 each As Needed (Check blood sugars 3 times daily as needed.)., Disp: 1 each, Rfl: 0  •  Lancets misc, Check blood sugars 3 times daily as needed., Disp: 100 each, Rfl: 5       Plan of care reviewed with the patient at the conclusion of today's visit.  Education was provided regarding diagnosis, management, and any prescribed or  recommended OTC medications.  Patient verbalized understanding of and agreement with management plan.     Return in about 2 weeks (around 7/8/2021) for Medicare Wellness.      Joshua Youssef, APRN

## 2021-06-25 LAB — HCV AB SER DONR QL: NORMAL

## 2021-06-29 ENCOUNTER — PATIENT ROUNDING (BHMG ONLY) (OUTPATIENT)
Dept: INTERNAL MEDICINE | Facility: CLINIC | Age: 76
End: 2021-06-29

## 2021-06-29 NOTE — PROGRESS NOTES
June 29, 2021    Hello, may I speak with Kelvin French?    My name is Alanna.      I am  with [facility]  [address] [place] [address] [place] [id number] [phone].    Before we get started may I verify your date of birth? 1945    I am calling to officially welcome you to our practice and ask about your recent visit. Is this a good time to talk? yes    Tell me about your visit with us. What things went well?  Everything went fine and pretty good.        We're always looking for ways to make our patients' experiences even better. Do you have recommendations on ways we may improve?  no    Overall were you satisfied with your first visit to our practice? yes       I appreciate you taking the time to speak with me today. Is there anything else I can do for you? Yes, pt asked about when his next appt. Was and I confirmed with pt.       Thank you, and have a great day.

## 2021-07-09 ENCOUNTER — LAB (OUTPATIENT)
Dept: LAB | Facility: HOSPITAL | Age: 76
End: 2021-07-09

## 2021-07-09 ENCOUNTER — OFFICE VISIT (OUTPATIENT)
Dept: INTERNAL MEDICINE | Facility: CLINIC | Age: 76
End: 2021-07-09

## 2021-07-09 VITALS
HEART RATE: 91 BPM | WEIGHT: 199 LBS | OXYGEN SATURATION: 98 % | BODY MASS INDEX: 29.47 KG/M2 | TEMPERATURE: 97.4 F | DIASTOLIC BLOOD PRESSURE: 90 MMHG | SYSTOLIC BLOOD PRESSURE: 184 MMHG | RESPIRATION RATE: 16 BRPM | HEIGHT: 69 IN

## 2021-07-09 DIAGNOSIS — I10 ESSENTIAL HYPERTENSION: ICD-10-CM

## 2021-07-09 DIAGNOSIS — E78.5 DYSLIPIDEMIA: ICD-10-CM

## 2021-07-09 DIAGNOSIS — Z23 ENCOUNTER FOR IMMUNIZATION: ICD-10-CM

## 2021-07-09 DIAGNOSIS — E11.65 TYPE 2 DIABETES MELLITUS WITH HYPERGLYCEMIA, WITHOUT LONG-TERM CURRENT USE OF INSULIN (HCC): ICD-10-CM

## 2021-07-09 DIAGNOSIS — Z00.00 MEDICARE ANNUAL WELLNESS VISIT, SUBSEQUENT: Primary | ICD-10-CM

## 2021-07-09 PROCEDURE — 82043 UR ALBUMIN QUANTITATIVE: CPT | Performed by: NURSE PRACTITIONER

## 2021-07-09 PROCEDURE — 99397 PER PM REEVAL EST PAT 65+ YR: CPT | Performed by: NURSE PRACTITIONER

## 2021-07-09 PROCEDURE — 90715 TDAP VACCINE 7 YRS/> IM: CPT | Performed by: NURSE PRACTITIONER

## 2021-07-09 PROCEDURE — 90471 IMMUNIZATION ADMIN: CPT | Performed by: NURSE PRACTITIONER

## 2021-07-09 RX ORDER — CLONIDINE HYDROCHLORIDE 0.1 MG/1
TABLET ORAL
Qty: 60 TABLET | Refills: 5 | Status: SHIPPED | OUTPATIENT
Start: 2021-07-09 | End: 2021-07-26 | Stop reason: SDUPTHER

## 2021-07-09 NOTE — PROGRESS NOTES
The ABCs of the Annual Wellness Visit  Subsequent Medicare Wellness Visit    Chief Complaint   Patient presents with   • Annual Exam       Subjective   History of Present Illness:  Kelvin French is a 75 y.o. male who presents for a Subsequent Medicare Wellness Visit.    HEALTH RISK ASSESSMENT    Recent Hospitalizations:  Recently treated at the following:  [facility]    Current Medical Providers:  Patient Care Team:  Joshua Youssef APRN as PCP - General (Family Medicine)    Smoking Status:  Social History     Tobacco Use   Smoking Status Former Smoker   Smokeless Tobacco Never Used   Tobacco Comment    quit in the 1980's       Alcohol Consumption:  Social History     Substance and Sexual Activity   Alcohol Use Not Currently    Comment: quit 5 years ago       Depression Screen:   PHQ-2/PHQ-9 Depression Screening 7/9/2021   Little interest or pleasure in doing things 0   Feeling down, depressed, or hopeless 2   Trouble falling or staying asleep, or sleeping too much 0   Feeling tired or having little energy 0   Poor appetite or overeating 0   Feeling bad about yourself - or that you are a failure or have let yourself or your family down 0   Trouble concentrating on things, such as reading the newspaper or watching television 0   Moving or speaking so slowly that other people could have noticed. Or the opposite - being so fidgety or restless that you have been moving around a lot more than usual 0   Thoughts that you would be better off dead, or of hurting yourself in some way 0   Total Score 2   If you checked off any problems, how difficult have these problems made it for you to do your work, take care of things at home, or get along with other people? Not difficult at all       Fall Risk Screen:  STEADI Fall Risk Assessment was completed, and patient is at LOW risk for falls.Assessment completed on:7/9/2021    Health Habits and Functional and Cognitive Screening:  Functional & Cognitive Status  7/9/2021   Do you have difficulty preparing food and eating? Yes   Do you have difficulty bathing yourself, getting dressed or grooming yourself? No   Do you have difficulty using the toilet? No   Do you have difficulty moving around from place to place? Yes   Do you have trouble with steps or getting out of a bed or a chair? No   Current Diet Well Balanced Diet   Dental Exam Up to date   Eye Exam Up to date   Exercise (times per week) 0 times per week   Current Exercises Include No Regular Exercise   Do you need help using the phone?  No   Are you deaf or do you have serious difficulty hearing?  No   Do you need help with transportation? No   Do you need help shopping? Yes   Do you need help preparing meals?  No   Do you need help with housework?  No   Do you need help with laundry? No   Do you need help taking your medications? No   Do you need help managing money? No   Do you ever drive or ride in a car without wearing a seat belt? No   Have you felt unusual stress, anger or loneliness in the last month? No   Who do you live with? Alone   If you need help, do you have trouble finding someone available to you? No   Have you been bothered in the last four weeks by sexual problems? No   Do you have difficulty concentrating, remembering or making decisions? No         Does the patient have evidence of cognitive impairment? Yes    Asprin use counseling:Taking ASA appropriately as indicated    Age-appropriate Screening Schedule:  Refer to the list below for future screening recommendations based on patient's age, sex and/or medical conditions. Orders for these recommended tests are listed in the plan section. The patient has been provided with a written plan.    Health Maintenance   Topic Date Due   • URINE MICROALBUMIN  Never done   • ZOSTER VACCINE (1 of 2) Never done   • DIABETIC FOOT EXAM  Never done   • INFLUENZA VACCINE  08/01/2021   • HEMOGLOBIN A1C  12/17/2021   • DIABETIC EYE EXAM  05/01/2022   • TDAP/TD  VACCINES (2 - Td or Tdap) 07/09/2031          The following portions of the patient's history were reviewed and updated as appropriate: allergies, current medications, past family history, past medical history, past social history, past surgical history and problem list.    Outpatient Medications Prior to Visit   Medication Sig Dispense Refill   • amLODIPine (NORVASC) 10 MG tablet Take 1 tablet by mouth Daily. 30 tablet 0   • aspirin 81 MG EC tablet Take 1 tablet by mouth Daily. 30 tablet 0   • atorvastatin (LIPITOR) 80 MG tablet Take 1 tablet by mouth Every Night. 30 tablet 0   • Blood Pressure Monitoring (Adult Blood Pressure Cuff Lg) kit 1 each Daily. 1 each 0   • carvedilol (COREG) 25 MG tablet Take 1 tablet by mouth Every 12 (Twelve) Hours. 60 tablet 0   • clopidogrel (PLAVIX) 75 MG tablet Take 1 tablet by mouth Daily. 30 tablet 0   • glucose blood (Glucose Meter Test) test strip Check blood sugars 3 times daily as needed. 100 each 5   • glucose monitor monitoring kit 1 each As Needed (Check blood sugars 3 times daily as needed.). 1 each 0   • Lancets misc Check blood sugars 3 times daily as needed. 100 each 5   • metFORMIN ER (GLUCOPHAGE-XR) 500 MG 24 hr tablet Take 1 tablet by mouth Daily With Breakfast. 30 tablet 0   • cloNIDine (CATAPRES) 0.1 MG tablet Take one tablet by mouth daily. 30 tablet 5     No facility-administered medications prior to visit.       Patient Active Problem List   Diagnosis   • Elevated serum creatinine   • New onset type 2 diabetes mellitus (CMS/HCC)   • Cerebral atherosclerosis       Advanced Care Planning:  ACP discussion was held with the patient during this visit. Patient does not have an advance directive, declines further assistance.    Review of Systems   Constitutional: Negative for activity change, appetite change and fatigue.   HENT: Negative for congestion.    Respiratory: Negative for cough and shortness of breath.    Cardiovascular: Negative for chest pain and leg  "swelling.   Gastrointestinal: Negative for abdominal pain.   Neurological: Negative for dizziness, weakness and confusion.   Psychiatric/Behavioral: Negative for behavioral problems and decreased concentration.       Compared to one year ago, the patient feels his physical health is worse.  Compared to one year ago, the patient feels his mental health is the same.    Reviewed chart for potential of high risk medication in the elderly: yes  Reviewed chart for potential of harmful drug interactions in the elderly:yes    Objective         Vitals:    07/09/21 1256   BP: (!) 184/90   Pulse: 91   Resp: 16   Temp: 97.4 °F (36.3 °C)   SpO2: 98%   Weight: 90.3 kg (199 lb)   Height: 175.3 cm (69\")   PainSc:   4   PainLoc: Eye       Body mass index is 29.39 kg/m².  Discussed the patient's BMI with him. The BMI is above average; BMI management plan is completed.    Physical Exam  Vitals and nursing note reviewed.   HENT:      Head: Normocephalic.   Eyes:      Pupils: Pupils are equal, round, and reactive to light.   Cardiovascular:      Rate and Rhythm: Normal rate and regular rhythm.      Pulses: Normal pulses.      Heart sounds: Normal heart sounds.   Pulmonary:      Effort: Pulmonary effort is normal.      Breath sounds: Normal breath sounds.   Skin:     General: Skin is warm and dry.      Capillary Refill: Capillary refill takes less than 2 seconds.   Neurological:      General: No focal deficit present.      Mental Status: He is alert and oriented to person, place, and time.      Gait: Gait is intact.   Psychiatric:         Attention and Perception: Attention normal.         Mood and Affect: Mood normal.         Behavior: Behavior normal.         Assessment/Plan   Medicare Risks and Personalized Health Plan  CMS Preventative Services Quick Reference  Advance Directive Discussion  Cardiovascular risk  Colon Cancer Screening  Fall Risk  Immunizations Discussed/Encouraged (specific immunizations; Tdap and Pneumococcal 23 ) " Shingles    The above risks/problems have been discussed with the patient.  Pertinent information has been shared with the patient in the After Visit Summary.  Follow up plans and orders are seen below in the Assessment/Plan Section.    Diagnoses and all orders for this visit:    1. Medicare annual wellness visit, subsequent (Primary)    2. Encounter for immunization  -     pneumococcal conj. 13-valent (PREVNAR-13) vaccine 0.5 mL  -     Tdap Vaccine Greater Than or Equal To 8yo IM    3. Essential hypertension  -     cloNIDine (CATAPRES) 0.1 MG tablet; Take one tablet by mouth twice daily  Dispense: 60 tablet; Refill: 5    4. Type 2 diabetes mellitus with hyperglycemia, without long-term current use of insulin (CMS/[facility])  -     MicroAlbumin, Urine, Random - Urine, Clean Catch    5. Dyslipidemia    increase Clonidine to twice daily for better BP control  No falls the past year  Update vaccines today  Recommend he get Shingles vaccine at pharmacy  Pt adiment that he had cologuard testing in March  No tobacco/ETOH use  Labs UTD  Get urine microalbumin today    Follow Up:  Return in about 2 weeks (around 7/23/2021) for Recheck HTN.     An After Visit Summary and PPPS were given to the patient.

## 2021-07-10 LAB — ALBUMIN UR-MCNC: <1.2 MG/DL

## 2021-07-26 DIAGNOSIS — I10 ESSENTIAL HYPERTENSION: ICD-10-CM

## 2021-07-26 RX ORDER — METFORMIN HYDROCHLORIDE 500 MG/1
500 TABLET, EXTENDED RELEASE ORAL
Qty: 30 TABLET | Refills: 5 | Status: SHIPPED | OUTPATIENT
Start: 2021-07-26 | End: 2021-07-30 | Stop reason: SDUPTHER

## 2021-07-26 RX ORDER — AMLODIPINE BESYLATE 10 MG/1
10 TABLET ORAL
Qty: 30 TABLET | Refills: 5 | Status: SHIPPED | OUTPATIENT
Start: 2021-07-26 | End: 2021-07-30 | Stop reason: SDUPTHER

## 2021-07-26 RX ORDER — ASPIRIN 81 MG/1
81 TABLET ORAL DAILY
Qty: 90 TABLET | Refills: 1 | Status: SHIPPED | OUTPATIENT
Start: 2021-07-26 | End: 2021-08-05 | Stop reason: SDUPTHER

## 2021-07-26 RX ORDER — CLONIDINE HYDROCHLORIDE 0.1 MG/1
TABLET ORAL
Qty: 60 TABLET | Refills: 5 | Status: SHIPPED | OUTPATIENT
Start: 2021-07-26 | End: 2021-07-30 | Stop reason: SDUPTHER

## 2021-07-26 RX ORDER — ATORVASTATIN CALCIUM 80 MG/1
80 TABLET, FILM COATED ORAL NIGHTLY
Qty: 30 TABLET | Refills: 5 | Status: SHIPPED | OUTPATIENT
Start: 2021-07-26 | End: 2021-07-30 | Stop reason: SDUPTHER

## 2021-07-26 NOTE — TELEPHONE ENCOUNTER
Caller: Kelvin French    Relationship: Self    Best call back number [phone]    Medication needed:   PATIENT IS UNSURE WHAT MEDICATIONS. PATIENT STATES HE ONLY HAS THE CARVEDILOL AND BLOOD PRESSURE MEDICATION    When do you need the refill by: ASAP    What additional details did the patient provide when requesting the medication: PATIENT STATES HE MISSED THE APPOINTMENT Friday DUE TO NOT HAVING TRANSPORTATION AND HE COULD NOT SEE TO CALL. PATIENT STATES HE WOULD LIKE A CALL BACK ASAP    Does the patient have less than a 3 day supply:  [x] Yes  [] No    What is the patient's preferred pharmacy: [facility], [place] - [facility] [address] - [phone]  - [phone] FX

## 2021-07-30 ENCOUNTER — TELEPHONE (OUTPATIENT)
Dept: INTERNAL MEDICINE | Facility: CLINIC | Age: 76
End: 2021-07-30

## 2021-07-30 DIAGNOSIS — I10 ESSENTIAL HYPERTENSION: ICD-10-CM

## 2021-07-30 RX ORDER — METFORMIN HYDROCHLORIDE 500 MG/1
500 TABLET, EXTENDED RELEASE ORAL
Qty: 30 TABLET | Refills: 5 | Status: SHIPPED | OUTPATIENT
Start: 2021-07-30 | End: 2021-08-05 | Stop reason: SDUPTHER

## 2021-07-30 RX ORDER — CLOPIDOGREL BISULFATE 75 MG/1
75 TABLET ORAL DAILY
Qty: 30 TABLET | Refills: 0 | Status: SHIPPED | OUTPATIENT
Start: 2021-07-30 | End: 2021-08-05 | Stop reason: SDUPTHER

## 2021-07-30 RX ORDER — ATORVASTATIN CALCIUM 80 MG/1
80 TABLET, FILM COATED ORAL NIGHTLY
Qty: 30 TABLET | Refills: 5 | Status: SHIPPED | OUTPATIENT
Start: 2021-07-30 | End: 2021-08-05 | Stop reason: SDUPTHER

## 2021-07-30 RX ORDER — AMLODIPINE BESYLATE 10 MG/1
10 TABLET ORAL
Qty: 30 TABLET | Refills: 5 | Status: SHIPPED | OUTPATIENT
Start: 2021-07-30 | End: 2021-08-05 | Stop reason: SDUPTHER

## 2021-07-30 RX ORDER — CLONIDINE HYDROCHLORIDE 0.1 MG/1
TABLET ORAL
Qty: 60 TABLET | Refills: 5 | Status: SHIPPED | OUTPATIENT
Start: 2021-07-30 | End: 2021-08-05 | Stop reason: SDUPTHER

## 2021-07-30 NOTE — TELEPHONE ENCOUNTER
Caller: [facility] - [address] - [phone] [facility] [phone] FX    Relationship: Pharmacy    Best call back number: [phone]    Medication needed:   ALL MEDICATIONS WERE TO BE TRANSFERRED TO THIS PHARMACY    When do you need the refill by: ASAP    What additional details did the patient provide when requesting the medication:    Does the patient have less than a 3 day supply:  [x] Yes  [] No    What is the patient's preferred    [facility] - [address] - [phone] [facility] [phone] FX

## 2021-07-30 NOTE — TELEPHONE ENCOUNTER
Caller: Kelvin French    Relationship: Self    Best call back number: [phone]    Medication needed:     PATIENT NEEDS ALL MEDICATION SENT TO     [facility] - [address] [phone] [facility] [phone] FX    HE DOES NOT KNOW THE NAME OF THE MEDICATIONS    HE SAID HE NEEDS CHOLESTEROL, DIABETES MEDICATION, CHOLESTEROL MEDICATION, AND ASPIRIN      When do you need the refill by: ASAP    What additional details did the patient provide when requesting the medication:     Does the patient have less than a 3 day supply:  [x] Yes  [] No    :

## 2021-07-30 NOTE — TELEPHONE ENCOUNTER
Spoke with a rep from [facility] she wanted to know if samantha would discuss with the patient about having a colonoscopy or doing a cologuard. Nolvia said which ever the patient is comfortable doing. If you need to speak with Nolvia you can reach her at [phone].

## 2021-08-05 ENCOUNTER — OFFICE VISIT (OUTPATIENT)
Dept: INTERNAL MEDICINE | Facility: CLINIC | Age: 76
End: 2021-08-05

## 2021-08-05 VITALS
WEIGHT: 190.2 LBS | BODY MASS INDEX: 28.17 KG/M2 | HEIGHT: 69 IN | HEART RATE: 111 BPM | SYSTOLIC BLOOD PRESSURE: 170 MMHG | TEMPERATURE: 97.6 F | OXYGEN SATURATION: 95 % | DIASTOLIC BLOOD PRESSURE: 86 MMHG

## 2021-08-05 DIAGNOSIS — E11.65 TYPE 2 DIABETES MELLITUS WITH HYPERGLYCEMIA, WITHOUT LONG-TERM CURRENT USE OF INSULIN (HCC): Primary | ICD-10-CM

## 2021-08-05 DIAGNOSIS — R63.1 POLYDIPSIA: ICD-10-CM

## 2021-08-05 DIAGNOSIS — I10 ESSENTIAL HYPERTENSION: ICD-10-CM

## 2021-08-05 PROCEDURE — 99213 OFFICE O/P EST LOW 20 MIN: CPT | Performed by: NURSE PRACTITIONER

## 2021-08-05 RX ORDER — CLOPIDOGREL BISULFATE 75 MG/1
75 TABLET ORAL DAILY
Qty: 30 TABLET | Refills: 5 | Status: SHIPPED | OUTPATIENT
Start: 2021-08-05 | End: 2022-03-14 | Stop reason: SDUPTHER

## 2021-08-05 RX ORDER — CARVEDILOL 25 MG/1
25 TABLET ORAL EVERY 12 HOURS SCHEDULED
Qty: 60 TABLET | Refills: 0 | Status: SHIPPED | OUTPATIENT
Start: 2021-08-05 | End: 2022-03-14 | Stop reason: SDUPTHER

## 2021-08-05 RX ORDER — CLONIDINE HYDROCHLORIDE 0.1 MG/1
TABLET ORAL
Qty: 60 TABLET | Refills: 5 | Status: SHIPPED | OUTPATIENT
Start: 2021-08-05 | End: 2022-03-14 | Stop reason: SDUPTHER

## 2021-08-05 RX ORDER — ATORVASTATIN CALCIUM 80 MG/1
80 TABLET, FILM COATED ORAL NIGHTLY
Qty: 30 TABLET | Refills: 5 | Status: SHIPPED | OUTPATIENT
Start: 2021-08-05 | End: 2022-03-14 | Stop reason: SDUPTHER

## 2021-08-05 RX ORDER — METFORMIN HYDROCHLORIDE 500 MG/1
500 TABLET, EXTENDED RELEASE ORAL
Qty: 30 TABLET | Refills: 5 | Status: SHIPPED | OUTPATIENT
Start: 2021-08-05 | End: 2022-03-14 | Stop reason: SDUPTHER

## 2021-08-05 RX ORDER — ASPIRIN 81 MG/1
81 TABLET ORAL DAILY
Qty: 90 TABLET | Refills: 3 | Status: SHIPPED | OUTPATIENT
Start: 2021-08-05 | End: 2022-03-14 | Stop reason: SDUPTHER

## 2021-08-05 RX ORDER — AMLODIPINE BESYLATE 10 MG/1
10 TABLET ORAL
Qty: 30 TABLET | Refills: 5 | Status: SHIPPED | OUTPATIENT
Start: 2021-08-05 | End: 2022-03-14 | Stop reason: SDUPTHER

## 2021-08-05 NOTE — PROGRESS NOTES
"Chief Complaint   Patient presents with   • Hypertension     follow up        HPI  Kelvin French is a 75 y.o. male presents for follow-up on HTN.  Pt was started on Clonidine at his last appt.  His blood pressure remains high today.  He states that he stopped all his medication except the Clonidine because \"something was making me too thirsty\".   Pt states he feels like his sugars are doing \"pretty good\" although he cannot give me a reading.  He states he cut down on sugars and starches in his diet.      The following portions of the patient's history were reviewed and updated as appropriate: allergies, current medications, past family history, past medical history, past social history, past surgical history and problem list.    Subjective  Review of Systems   Constitutional: Negative for activity change, appetite change and fatigue.   HENT: Negative for congestion.    Respiratory: Negative for cough and shortness of breath.    Cardiovascular: Negative for chest pain and leg swelling.   Gastrointestinal: Negative for abdominal pain.   Endocrine: Positive for polydipsia.   Neurological: Negative for dizziness, weakness and confusion.   Psychiatric/Behavioral: Negative for behavioral problems and decreased concentration.       Objective  Visit Vitals  /86   Pulse 111   Temp 97.6 °F (36.4 °C) (Temporal)   Ht 175.3 cm (69\")   Wt 86.3 kg (190 lb 3.2 oz)   SpO2 95%   BMI 28.09 kg/m²        Physical Exam  Vitals and nursing note reviewed.   HENT:      Head: Normocephalic.   Eyes:      Pupils: Pupils are equal, round, and reactive to light.   Cardiovascular:      Rate and Rhythm: Normal rate and regular rhythm.      Pulses: Normal pulses.      Heart sounds: Normal heart sounds.   Pulmonary:      Effort: Pulmonary effort is normal.      Breath sounds: Normal breath sounds.   Skin:     General: Skin is warm and dry.      Capillary Refill: Capillary refill takes less than 2 seconds.   Neurological:      General: No " focal deficit present.      Mental Status: He is alert and oriented to person, place, and time.      Gait: Gait is intact.   Psychiatric:         Attention and Perception: Attention normal.         Mood and Affect: Mood normal.         Behavior: Behavior normal.          Procedures     Assessment and Plan  Diagnoses and all orders for this visit:    1. Type 2 diabetes mellitus with hyperglycemia, without long-term current use of insulin (CMS/[facility]) (Primary)  -     metFORMIN ER (GLUCOPHAGE-XR) 500 MG 24 hr tablet; Take 1 tablet by mouth Daily With Breakfast.  Dispense: 30 tablet; Refill: 5  -     aspirin 81 MG EC tablet; Take 1 tablet by mouth Daily.  Dispense: 90 tablet; Refill: 3    2. Essential hypertension  -     clopidogrel (PLAVIX) 75 MG tablet; Take 1 tablet by mouth Daily.  Dispense: 30 tablet; Refill: 5  -     cloNIDine (CATAPRES) 0.1 MG tablet; Take one tablet by mouth twice daily  Dispense: 60 tablet; Refill: 5  -     carvedilol (COREG) 25 MG tablet; Take 1 tablet by mouth Every 12 (Twelve) Hours.  Dispense: 60 tablet; Refill: 0  -     amLODIPine (NORVASC) 10 MG tablet; Take 1 tablet by mouth Daily.  Dispense: 30 tablet; Refill: 5    3. Polydipsia    Other orders  -     atorvastatin (LIPITOR) 80 MG tablet; Take 1 tablet by mouth Every Night.  Dispense: 30 tablet; Refill: 5    BP high today  d/w pt the importance of taking all his medicine  Excessive thirst likely related to diabetes  RESTART ALL MEDS!    Return in about 6 weeks (around 9/16/2021) for HTN, Diabetes.      RACHEL Mariscal

## 2021-09-24 ENCOUNTER — OFFICE VISIT (OUTPATIENT)
Dept: INTERNAL MEDICINE | Facility: CLINIC | Age: 76
End: 2021-09-24

## 2021-09-24 VITALS
WEIGHT: 180 LBS | BODY MASS INDEX: 26.66 KG/M2 | HEART RATE: 81 BPM | HEIGHT: 69 IN | DIASTOLIC BLOOD PRESSURE: 70 MMHG | OXYGEN SATURATION: 99 % | SYSTOLIC BLOOD PRESSURE: 142 MMHG | TEMPERATURE: 97.2 F

## 2021-09-24 DIAGNOSIS — I10 ESSENTIAL HYPERTENSION: ICD-10-CM

## 2021-09-24 DIAGNOSIS — I67.2 CEREBRAL ATHEROSCLEROSIS: ICD-10-CM

## 2021-09-24 DIAGNOSIS — E11.65 TYPE 2 DIABETES MELLITUS WITH HYPERGLYCEMIA, WITHOUT LONG-TERM CURRENT USE OF INSULIN (HCC): Primary | ICD-10-CM

## 2021-09-24 DIAGNOSIS — E78.5 DYSLIPIDEMIA: ICD-10-CM

## 2021-09-24 LAB — HBA1C MFR BLD: 6.8 %

## 2021-09-24 PROCEDURE — 99214 OFFICE O/P EST MOD 30 MIN: CPT | Performed by: NURSE PRACTITIONER

## 2021-09-24 PROCEDURE — 83036 HEMOGLOBIN GLYCOSYLATED A1C: CPT | Performed by: NURSE PRACTITIONER

## 2021-09-24 NOTE — PROGRESS NOTES
"Chief Complaint   Patient presents with   • Diabetes     follow up        HPI  Kelvin French is a 76 y.o. male presents for follow-up on DM and HTN.  A1c today is 6.8.  Current treatment for diabetes is Glucophage XR.  Currently on Carvedilol and Amlodipine for blood pressure control.  Does not check his sugars because he cannot see to do so.    Recently had appt with eye drMaren  May have to have surgery on right eye.  Can only see 10% out of the eye.        The following portions of the patient's history were reviewed and updated as appropriate: allergies, current medications, past family history, past medical history, past social history, past surgical history and problem list.    Subjective  Review of Systems   Constitutional: Negative for activity change, appetite change and fatigue.   HENT: Negative for congestion.    Respiratory: Negative for cough and shortness of breath.    Cardiovascular: Negative for chest pain and leg swelling.   Gastrointestinal: Negative for abdominal pain.   Neurological: Negative for dizziness, weakness and confusion.   Psychiatric/Behavioral: Negative for behavioral problems and decreased concentration.       Objective  Visit Vitals  /70   Pulse 81   Temp 97.2 °F (36.2 °C) (Temporal)   Ht 175.3 cm (69\")   Wt 81.6 kg (180 lb)   SpO2 99%   BMI 26.58 kg/m²        Physical Exam  Vitals and nursing note reviewed.   HENT:      Head: Normocephalic.   Eyes:      Pupils: Pupils are equal, round, and reactive to light.   Cardiovascular:      Rate and Rhythm: Normal rate and regular rhythm.      Pulses: Normal pulses.      Heart sounds: Normal heart sounds. No murmur heard.     Pulmonary:      Effort: Pulmonary effort is normal.      Breath sounds: Normal breath sounds.   Skin:     General: Skin is warm and dry.      Capillary Refill: Capillary refill takes less than 2 seconds.   Neurological:      General: No focal deficit present.      Mental Status: He is alert and oriented to person, " place, and time.      Gait: Gait is intact.   Psychiatric:         Attention and Perception: Attention normal.         Mood and Affect: Mood normal.         Behavior: Behavior normal.          Results for orders placed or performed in visit on 09/24/21   POCT glycated hemoglobin, total    Specimen: Urine   Result Value Ref Range    Hemoglobin A1C 6.8 %       Procedures     Assessment and Plan  Diagnoses and all orders for this visit:    1. Type 2 diabetes mellitus with hyperglycemia, without long-term current use of insulin (CMS/[facility]) (Primary)  -     POCT glycated hemoglobin, total    2. Essential hypertension    3. Dyslipidemia    4. Cerebral atherosclerosis    BP well controlled with Carvedilol, Amlodipine, and Clonidine, will cont  Lipids controlled with statin, will continue  Diabetes well controlled with Metformin, 6.8, at goal    Return in about 4 months (around 1/24/2022) for diabetes, HTN.         RACHEL Mariscal

## 2022-03-14 ENCOUNTER — OFFICE VISIT (OUTPATIENT)
Dept: INTERNAL MEDICINE | Facility: CLINIC | Age: 77
End: 2022-03-14

## 2022-03-14 VITALS
OXYGEN SATURATION: 99 % | HEART RATE: 74 BPM | DIASTOLIC BLOOD PRESSURE: 96 MMHG | WEIGHT: 170.2 LBS | TEMPERATURE: 97.6 F | HEIGHT: 69 IN | SYSTOLIC BLOOD PRESSURE: 164 MMHG | BODY MASS INDEX: 25.21 KG/M2

## 2022-03-14 DIAGNOSIS — E78.5 DYSLIPIDEMIA: ICD-10-CM

## 2022-03-14 DIAGNOSIS — I10 ESSENTIAL HYPERTENSION: Primary | ICD-10-CM

## 2022-03-14 DIAGNOSIS — E11.9 TYPE 2 DIABETES MELLITUS WITHOUT COMPLICATION, WITHOUT LONG-TERM CURRENT USE OF INSULIN: ICD-10-CM

## 2022-03-14 LAB
EXPIRATION DATE: NORMAL
HBA1C MFR BLD: 6.1 %
Lab: NORMAL

## 2022-03-14 PROCEDURE — 99214 OFFICE O/P EST MOD 30 MIN: CPT | Performed by: NURSE PRACTITIONER

## 2022-03-14 PROCEDURE — 83036 HEMOGLOBIN GLYCOSYLATED A1C: CPT | Performed by: NURSE PRACTITIONER

## 2022-03-14 RX ORDER — ASPIRIN 81 MG/1
81 TABLET ORAL DAILY
Qty: 90 TABLET | Refills: 3 | Status: SHIPPED | OUTPATIENT
Start: 2022-03-14 | End: 2022-08-08 | Stop reason: SDUPTHER

## 2022-03-14 RX ORDER — CARVEDILOL 25 MG/1
25 TABLET ORAL EVERY 12 HOURS SCHEDULED
Qty: 60 TABLET | Refills: 1 | Status: SHIPPED | OUTPATIENT
Start: 2022-03-14 | End: 2022-08-08 | Stop reason: SDUPTHER

## 2022-03-14 RX ORDER — METFORMIN HYDROCHLORIDE 500 MG/1
500 TABLET, EXTENDED RELEASE ORAL
Qty: 30 TABLET | Refills: 5 | Status: SHIPPED | OUTPATIENT
Start: 2022-03-14 | End: 2022-08-08 | Stop reason: SDUPTHER

## 2022-03-14 RX ORDER — DOXAZOSIN MESYLATE 1 MG/1
1 TABLET ORAL NIGHTLY
Qty: 30 TABLET | Refills: 5 | Status: SHIPPED | OUTPATIENT
Start: 2022-03-14 | End: 2022-03-14

## 2022-03-14 RX ORDER — AMLODIPINE BESYLATE 10 MG/1
10 TABLET ORAL
Qty: 30 TABLET | Refills: 5 | Status: SHIPPED | OUTPATIENT
Start: 2022-03-14 | End: 2022-08-08 | Stop reason: SDUPTHER

## 2022-03-14 RX ORDER — CLOPIDOGREL BISULFATE 75 MG/1
75 TABLET ORAL DAILY
Qty: 30 TABLET | Refills: 5 | Status: SHIPPED | OUTPATIENT
Start: 2022-03-14 | End: 2022-08-08 | Stop reason: SDUPTHER

## 2022-03-14 RX ORDER — ATORVASTATIN CALCIUM 80 MG/1
80 TABLET, FILM COATED ORAL NIGHTLY
Qty: 30 TABLET | Refills: 5 | Status: SHIPPED | OUTPATIENT
Start: 2022-03-14 | End: 2022-08-08 | Stop reason: SDUPTHER

## 2022-03-14 RX ORDER — LOSARTAN POTASSIUM 25 MG/1
25 TABLET ORAL DAILY
Qty: 30 TABLET | Refills: 5 | Status: SHIPPED | OUTPATIENT
Start: 2022-03-14 | End: 2022-08-08 | Stop reason: SDUPTHER

## 2022-03-14 RX ORDER — CLONIDINE HYDROCHLORIDE 0.1 MG/1
TABLET ORAL
Qty: 60 TABLET | Refills: 5 | Status: SHIPPED | OUTPATIENT
Start: 2022-03-14 | End: 2022-08-08 | Stop reason: SDUPTHER

## 2022-03-14 NOTE — PROGRESS NOTES
"Chief Complaint   Patient presents with   • Hypertension     F/u        HPI  Kelvin French is a 76 y.o. male presents for follow-up on HTN.  Pt went to the ER on 3/9/22 for elevated BP.  Pt reported to the staff that he had not taken his BP meds x2 days so he was instructed to take them.  Pt states his BP has been running high for about a week.  He's unable to have cataract surgery due to this.  He states that he is taking all 3 of his blood pressure medications.  Denies any CP/SOB or swelling of extremities    The following portions of the patient's history were reviewed and updated as appropriate: allergies, current medications, past family history, past medical history, past social history, past surgical history and problem list.    Subjective  Review of Systems   Constitutional: Negative for activity change, appetite change and fatigue.   HENT: Negative for congestion.    Respiratory: Negative for cough and shortness of breath.    Cardiovascular: Negative for chest pain, palpitations and leg swelling.   Gastrointestinal: Negative for abdominal pain.   Neurological: Negative for dizziness, weakness, light-headedness, headache and confusion.   Psychiatric/Behavioral: Negative for behavioral problems and decreased concentration.       Objective  Visit Vitals  /96   Pulse 74   Temp 97.6 °F (36.4 °C) (Temporal)   Ht 175.3 cm (69\")   Wt 77.2 kg (170 lb 3.2 oz)   SpO2 99%   BMI 25.13 kg/m²        Physical Exam  Vitals and nursing note reviewed.   HENT:      Head: Normocephalic.   Eyes:      Pupils: Pupils are equal, round, and reactive to light.   Cardiovascular:      Rate and Rhythm: Normal rate and regular rhythm.      Pulses: Normal pulses.      Heart sounds: Normal heart sounds. No murmur heard.  Pulmonary:      Effort: Pulmonary effort is normal.      Breath sounds: Normal breath sounds.   Skin:     General: Skin is warm and dry.      Capillary Refill: Capillary refill takes less than 2 seconds. "   Neurological:      General: No focal deficit present.      Mental Status: He is alert and oriented to person, place, and time.      Gait: Gait is intact.   Psychiatric:         Attention and Perception: Attention normal.         Mood and Affect: Mood normal.         Behavior: Behavior normal.          Procedures       Results for orders placed or performed in visit on 03/14/22   POC Glycosylated Hemoglobin (Hb A1C)    Specimen: Blood   Result Value Ref Range    Hemoglobin A1C 6.1 %    Lot Number [id number]     Expiration Date 10/21/2023        Assessment and Plan  Diagnoses and all orders for this visit:    1. Essential hypertension (Primary)  -     Discontinue: doxazosin (CARDURA) 1 MG tablet; Take 1 tablet by mouth Every Night.  Dispense: 30 tablet; Refill: 5  -     cloNIDine (CATAPRES) 0.1 MG tablet; Take one tablet by mouth twice daily  Dispense: 60 tablet; Refill: 5  -     carvedilol (COREG) 25 MG tablet; Take 1 tablet by mouth Every 12 (Twelve) Hours.  Dispense: 60 tablet; Refill: 1  -     amLODIPine (NORVASC) 10 MG tablet; Take 1 tablet by mouth Daily.  Dispense: 30 tablet; Refill: 5  -     clopidogrel (PLAVIX) 75 MG tablet; Take 1 tablet by mouth Daily.  Dispense: 30 tablet; Refill: 5  -     losartan (COZAAR) 25 MG tablet; Take 1 tablet by mouth Daily.  Dispense: 30 tablet; Refill: 5    2. Type 2 diabetes mellitus without complication, without long-term current use of insulin (HCC)  -     POC Glycosylated Hemoglobin (Hb A1C)  -     metFORMIN ER (GLUCOPHAGE-XR) 500 MG 24 hr tablet; Take 1 tablet by mouth Daily With Breakfast.  Dispense: 30 tablet; Refill: 5  -     aspirin 81 MG EC tablet; Take 1 tablet by mouth Daily.  Dispense: 90 tablet; Refill: 3    3. Dyslipidemia  -     atorvastatin (LIPITOR) 80 MG tablet; Take 1 tablet by mouth Every Night.  Dispense: 30 tablet; Refill: 5    pt is a poor historian  Add Losartan for better BP control, may need cardio referral  A1c still well controlled, cont Metformin  ER    Return in about 1 week (around 3/21/2022) for Recheck HTN.         Joshua Youssef APRN

## 2022-03-23 ENCOUNTER — OFFICE VISIT (OUTPATIENT)
Dept: INTERNAL MEDICINE | Facility: CLINIC | Age: 77
End: 2022-03-23

## 2022-03-23 VITALS
TEMPERATURE: 97.9 F | OXYGEN SATURATION: 99 % | SYSTOLIC BLOOD PRESSURE: 126 MMHG | WEIGHT: 169.8 LBS | BODY MASS INDEX: 25.15 KG/M2 | HEIGHT: 69 IN | DIASTOLIC BLOOD PRESSURE: 80 MMHG | HEART RATE: 80 BPM

## 2022-03-23 DIAGNOSIS — E78.5 DYSLIPIDEMIA: ICD-10-CM

## 2022-03-23 DIAGNOSIS — I10 ESSENTIAL HYPERTENSION: Primary | ICD-10-CM

## 2022-03-23 DIAGNOSIS — E11.9 TYPE 2 DIABETES MELLITUS WITHOUT COMPLICATION, WITHOUT LONG-TERM CURRENT USE OF INSULIN: ICD-10-CM

## 2022-03-23 PROCEDURE — 99214 OFFICE O/P EST MOD 30 MIN: CPT | Performed by: NURSE PRACTITIONER

## 2022-03-23 NOTE — PROGRESS NOTES
"Chief Complaint   Patient presents with   • Hypertension     F/u        HPI  Kelvin French is a 76 y.o. male presents for follow-up on HTN.  Losartan was added to his medication regimen last week.  He has been trying to avoid salt in his diet since last visit.  He has not checked his BP any at home because he can't see the dial.  He states he is feeling fine.  No complaints today.    The following portions of the patient's history were reviewed and updated as appropriate: allergies, current medications, past family history, past medical history, past social history, past surgical history and problem list.    Subjective  Review of Systems   Constitutional: Negative for activity change, appetite change and fatigue.   HENT: Negative for congestion.    Respiratory: Negative for cough and shortness of breath.    Cardiovascular: Negative for chest pain and leg swelling.   Gastrointestinal: Negative for abdominal pain.   Neurological: Negative for dizziness, weakness and confusion.   Psychiatric/Behavioral: Negative for behavioral problems and decreased concentration.       Objective  Visit Vitals  /80   Pulse 80   Temp 97.9 °F (36.6 °C) (Temporal)   Ht 175.3 cm (69\")   Wt 77 kg (169 lb 12.8 oz)   SpO2 99%   BMI 25.08 kg/m²        Physical Exam  Vitals and nursing note reviewed.   HENT:      Head: Normocephalic.   Eyes:      Pupils: Pupils are equal, round, and reactive to light.   Cardiovascular:      Rate and Rhythm: Normal rate and regular rhythm.      Pulses: Normal pulses.      Heart sounds: Normal heart sounds.   Pulmonary:      Effort: Pulmonary effort is normal.      Breath sounds: Normal breath sounds.   Skin:     General: Skin is warm and dry.      Capillary Refill: Capillary refill takes less than 2 seconds.   Neurological:      General: No focal deficit present.      Mental Status: He is alert and oriented to person, place, and time.      Gait: Gait is intact.   Psychiatric:         Attention and " Perception: Attention normal.         Mood and Affect: Mood normal.         Behavior: Behavior normal.          Procedures      Assessment and Plan  Diagnoses and all orders for this visit:    1. Essential hypertension (Primary)    2. Dyslipidemia    3. Type 2 diabetes mellitus without complication, without long-term current use of insulin (HCC)    Losartan working well, will cont with Clonidine/Coreg/Amlodipine  D/w pt to call the office if he felt dizzy or developed any headaches\  Cont Atorvastatin for lipids  DM still well controlled with Metformin, last A1c 6.1    Return in about 4 months (around 7/23/2022) for HTN, Diabetes.        Joshua Youssef, APRN

## 2022-08-08 ENCOUNTER — OFFICE VISIT (OUTPATIENT)
Dept: INTERNAL MEDICINE | Facility: CLINIC | Age: 77
End: 2022-08-08

## 2022-08-08 VITALS
DIASTOLIC BLOOD PRESSURE: 60 MMHG | HEART RATE: 74 BPM | SYSTOLIC BLOOD PRESSURE: 118 MMHG | TEMPERATURE: 96.3 F | WEIGHT: 170.4 LBS | BODY MASS INDEX: 25.24 KG/M2 | HEIGHT: 69 IN | OXYGEN SATURATION: 99 %

## 2022-08-08 DIAGNOSIS — R73.09 HEMOGLOBIN A1C LESS THAN 7.0%: ICD-10-CM

## 2022-08-08 DIAGNOSIS — I10 ESSENTIAL HYPERTENSION: ICD-10-CM

## 2022-08-08 DIAGNOSIS — H54.8 LEGALLY BLIND: ICD-10-CM

## 2022-08-08 DIAGNOSIS — E78.5 DYSLIPIDEMIA: ICD-10-CM

## 2022-08-08 DIAGNOSIS — E11.9 TYPE 2 DIABETES MELLITUS WITHOUT COMPLICATION, WITHOUT LONG-TERM CURRENT USE OF INSULIN: Primary | ICD-10-CM

## 2022-08-08 LAB
EXPIRATION DATE: NORMAL
HBA1C MFR BLD: 6.1 %
Lab: NORMAL

## 2022-08-08 PROCEDURE — 99214 OFFICE O/P EST MOD 30 MIN: CPT | Performed by: NURSE PRACTITIONER

## 2022-08-08 PROCEDURE — 83036 HEMOGLOBIN GLYCOSYLATED A1C: CPT | Performed by: NURSE PRACTITIONER

## 2022-08-08 PROCEDURE — 3044F HG A1C LEVEL LT 7.0%: CPT | Performed by: NURSE PRACTITIONER

## 2022-08-08 RX ORDER — ATORVASTATIN CALCIUM 80 MG/1
80 TABLET, FILM COATED ORAL NIGHTLY
Qty: 90 TABLET | Refills: 1 | Status: SHIPPED | OUTPATIENT
Start: 2022-08-08 | End: 2023-03-02

## 2022-08-08 RX ORDER — CLONIDINE HYDROCHLORIDE 0.1 MG/1
TABLET ORAL
Qty: 180 TABLET | Refills: 1 | Status: SHIPPED | OUTPATIENT
Start: 2022-08-08 | End: 2023-03-02

## 2022-08-08 RX ORDER — METFORMIN HYDROCHLORIDE 500 MG/1
500 TABLET, EXTENDED RELEASE ORAL
Qty: 90 TABLET | Refills: 1 | Status: SHIPPED | OUTPATIENT
Start: 2022-08-08 | End: 2023-03-02

## 2022-08-08 RX ORDER — LOSARTAN POTASSIUM 25 MG/1
25 TABLET ORAL DAILY
Qty: 90 TABLET | Refills: 1 | Status: SHIPPED | OUTPATIENT
Start: 2022-08-08 | End: 2023-03-02

## 2022-08-08 RX ORDER — CARVEDILOL 25 MG/1
25 TABLET ORAL EVERY 12 HOURS SCHEDULED
Qty: 180 TABLET | Refills: 1 | Status: SHIPPED | OUTPATIENT
Start: 2022-08-08 | End: 2023-03-02

## 2022-08-08 RX ORDER — ASPIRIN 81 MG/1
81 TABLET ORAL DAILY
Qty: 90 TABLET | Refills: 3 | Status: SHIPPED | OUTPATIENT
Start: 2022-08-08

## 2022-08-08 RX ORDER — AMLODIPINE BESYLATE 10 MG/1
10 TABLET ORAL
Qty: 90 TABLET | Refills: 1 | Status: SHIPPED | OUTPATIENT
Start: 2022-08-08 | End: 2023-03-02

## 2022-08-08 RX ORDER — CLOPIDOGREL BISULFATE 75 MG/1
75 TABLET ORAL DAILY
Qty: 90 TABLET | Refills: 1 | Status: SHIPPED | OUTPATIENT
Start: 2022-08-08

## 2022-08-08 NOTE — PROGRESS NOTES
"Chief Complaint   Patient presents with   • Diabetes       HPI  Kelvin French is a 76 y.o. male presents for follow-up on diabetes and HTN.  His last A1c was 6.1 in March.  He tries to watch his sugar intake.  He states he doesn't check his sugars because he can't see to do it.  He states he has been doing well.  He takes his meds as prescribed.  Needing refills today.    The following portions of the patient's history were reviewed and updated as appropriate: allergies, current medications, past family history, past medical history, past social history, past surgical history and problem list.    Subjective  Review of Systems   Constitutional: Negative for activity change, appetite change and fatigue.   HENT: Negative for congestion.    Respiratory: Negative for cough and shortness of breath.    Cardiovascular: Negative for chest pain and leg swelling.   Gastrointestinal: Negative for abdominal pain.   Neurological: Negative for dizziness, weakness and confusion.   Psychiatric/Behavioral: Negative for behavioral problems and decreased concentration.       Objective  Visit Vitals  /60 (BP Location: Left arm, Patient Position: Sitting)   Pulse 74   Temp 96.3 °F (35.7 °C)   Ht 175.3 cm (69\")   Wt 77.3 kg (170 lb 6.4 oz)   SpO2 99%   BMI 25.16 kg/m²        Physical Exam  Vitals and nursing note reviewed.   HENT:      Head: Normocephalic.   Eyes:      Pupils: Pupils are equal, round, and reactive to light.   Cardiovascular:      Rate and Rhythm: Normal rate and regular rhythm.      Pulses: Normal pulses.      Heart sounds: Normal heart sounds. No murmur heard.  Pulmonary:      Effort: Pulmonary effort is normal. No respiratory distress.      Breath sounds: Normal breath sounds. No wheezing.   Skin:     General: Skin is warm and dry.      Capillary Refill: Capillary refill takes less than 2 seconds.   Neurological:      General: No focal deficit present.      Mental Status: He is alert and oriented to person, " place, and time.      Gait: Gait is intact.   Psychiatric:         Attention and Perception: Attention normal.         Mood and Affect: Mood normal.         Behavior: Behavior normal.         Thought Content: Thought content normal.          Procedures       Results for orders placed or performed in visit on 08/08/22   POC Glycosylated Hemoglobin (Hb A1C)    Specimen: Blood   Result Value Ref Range    Hemoglobin A1C 6.1 %    Lot Number [id number]     Expiration Date 7,345,536          Assessment and Plan  Diagnoses and all orders for this visit:    1. Type 2 diabetes mellitus without complication, without long-term current use of insulin (HCC) (Primary)  -     POC Glycosylated Hemoglobin (Hb A1C)  -     aspirin 81 MG EC tablet; Take 1 tablet by mouth Daily.  Dispense: 90 tablet; Refill: 3  -     metFORMIN ER (GLUCOPHAGE-XR) 500 MG 24 hr tablet; Take 1 tablet by mouth Daily With Breakfast.  Dispense: 90 tablet; Refill: 1    2. Essential hypertension  -     amLODIPine (NORVASC) 10 MG tablet; Take 1 tablet by mouth Daily.  Dispense: 90 tablet; Refill: 1  -     carvedilol (COREG) 25 MG tablet; Take 1 tablet by mouth Every 12 (Twelve) Hours.  Dispense: 180 tablet; Refill: 1  -     cloNIDine (CATAPRES) 0.1 MG tablet; Take one tablet by mouth twice daily  Dispense: 180 tablet; Refill: 1  -     clopidogrel (PLAVIX) 75 MG tablet; Take 1 tablet by mouth Daily.  Dispense: 90 tablet; Refill: 1  -     losartan (COZAAR) 25 MG tablet; Take 1 tablet by mouth Daily.  Dispense: 90 tablet; Refill: 1    3. Dyslipidemia  -     atorvastatin (LIPITOR) 80 MG tablet; Take 1 tablet by mouth Every Night.  Dispense: 90 tablet; Refill: 1    4. Legally blind    5. Hemoglobin A1c less than 7.0%    meds refilled  A1c still doing well  Schedule [facility]    Return in about 4 months (around 12/8/2022) for Medicare Wellness.        RACHEL Mariscal

## 2023-01-12 ENCOUNTER — OFFICE VISIT (OUTPATIENT)
Dept: INTERNAL MEDICINE | Facility: CLINIC | Age: 78
End: 2023-01-12
Payer: MEDICARE

## 2023-01-12 ENCOUNTER — LAB (OUTPATIENT)
Dept: LAB | Facility: HOSPITAL | Age: 78
End: 2023-01-12
Payer: MEDICARE

## 2023-01-12 VITALS
BODY MASS INDEX: 25.77 KG/M2 | HEIGHT: 69 IN | DIASTOLIC BLOOD PRESSURE: 78 MMHG | SYSTOLIC BLOOD PRESSURE: 120 MMHG | TEMPERATURE: 97.1 F | OXYGEN SATURATION: 99 % | HEART RATE: 87 BPM | WEIGHT: 174 LBS

## 2023-01-12 DIAGNOSIS — E11.9 TYPE 2 DIABETES MELLITUS WITHOUT COMPLICATION, WITHOUT LONG-TERM CURRENT USE OF INSULIN: ICD-10-CM

## 2023-01-12 DIAGNOSIS — Z13.6 SCREENING FOR AAA (ABDOMINAL AORTIC ANEURYSM): ICD-10-CM

## 2023-01-12 DIAGNOSIS — Z79.01 CHRONIC ANTICOAGULATION: ICD-10-CM

## 2023-01-12 DIAGNOSIS — Z12.5 SCREENING FOR PROSTATE CANCER: ICD-10-CM

## 2023-01-12 DIAGNOSIS — Z23 ENCOUNTER FOR IMMUNIZATION: ICD-10-CM

## 2023-01-12 DIAGNOSIS — I10 ESSENTIAL HYPERTENSION: ICD-10-CM

## 2023-01-12 DIAGNOSIS — R73.09 HEMOGLOBIN A1C LESS THAN 7.0%: ICD-10-CM

## 2023-01-12 DIAGNOSIS — Z00.00 MEDICARE ANNUAL WELLNESS VISIT, SUBSEQUENT: ICD-10-CM

## 2023-01-12 DIAGNOSIS — H54.8 LEGALLY BLIND: ICD-10-CM

## 2023-01-12 DIAGNOSIS — I67.2 CEREBRAL ATHEROSCLEROSIS: ICD-10-CM

## 2023-01-12 DIAGNOSIS — Z00.00 MEDICARE ANNUAL WELLNESS VISIT, SUBSEQUENT: Primary | ICD-10-CM

## 2023-01-12 DIAGNOSIS — E78.5 DYSLIPIDEMIA: ICD-10-CM

## 2023-01-12 LAB
ALBUMIN UR-MCNC: 1.3 MG/DL
BASOPHILS # BLD AUTO: 0.03 10*3/MM3 (ref 0–0.2)
BASOPHILS NFR BLD AUTO: 0.6 % (ref 0–1.5)
DEPRECATED RDW RBC AUTO: 41.5 FL (ref 37–54)
EOSINOPHIL # BLD AUTO: 0.14 10*3/MM3 (ref 0–0.4)
EOSINOPHIL NFR BLD AUTO: 2.7 % (ref 0.3–6.2)
ERYTHROCYTE [DISTWIDTH] IN BLOOD BY AUTOMATED COUNT: 13.7 % (ref 12.3–15.4)
HBA1C MFR BLD: 6.2 % (ref 4.8–5.6)
HCT VFR BLD AUTO: 38.6 % (ref 37.5–51)
HGB BLD-MCNC: 12.2 G/DL (ref 13–17.7)
IMM GRANULOCYTES # BLD AUTO: 0.01 10*3/MM3 (ref 0–0.05)
IMM GRANULOCYTES NFR BLD AUTO: 0.2 % (ref 0–0.5)
LYMPHOCYTES # BLD AUTO: 2.04 10*3/MM3 (ref 0.7–3.1)
LYMPHOCYTES NFR BLD AUTO: 38.9 % (ref 19.6–45.3)
MCH RBC QN AUTO: 26.3 PG (ref 26.6–33)
MCHC RBC AUTO-ENTMCNC: 31.6 G/DL (ref 31.5–35.7)
MCV RBC AUTO: 83.4 FL (ref 79–97)
MONOCYTES # BLD AUTO: 0.38 10*3/MM3 (ref 0.1–0.9)
MONOCYTES NFR BLD AUTO: 7.2 % (ref 5–12)
NEUTROPHILS NFR BLD AUTO: 2.65 10*3/MM3 (ref 1.7–7)
NEUTROPHILS NFR BLD AUTO: 50.4 % (ref 42.7–76)
NRBC BLD AUTO-RTO: 0 /100 WBC (ref 0–0.2)
PLATELET # BLD AUTO: 249 10*3/MM3 (ref 140–450)
PMV BLD AUTO: 10 FL (ref 6–12)
RBC # BLD AUTO: 4.63 10*6/MM3 (ref 4.14–5.8)
WBC NRBC COR # BLD: 5.25 10*3/MM3 (ref 3.4–10.8)

## 2023-01-12 PROCEDURE — 1170F FXNL STATUS ASSESSED: CPT | Performed by: NURSE PRACTITIONER

## 2023-01-12 PROCEDURE — G0439 PPPS, SUBSEQ VISIT: HCPCS | Performed by: NURSE PRACTITIONER

## 2023-01-12 PROCEDURE — 82043 UR ALBUMIN QUANTITATIVE: CPT

## 2023-01-12 PROCEDURE — 2028F FOOT EXAM PERFORMED: CPT | Performed by: NURSE PRACTITIONER

## 2023-01-12 PROCEDURE — 36415 COLL VENOUS BLD VENIPUNCTURE: CPT

## 2023-01-12 PROCEDURE — 85025 COMPLETE CBC W/AUTO DIFF WBC: CPT

## 2023-01-12 PROCEDURE — G0009 ADMIN PNEUMOCOCCAL VACCINE: HCPCS | Performed by: NURSE PRACTITIONER

## 2023-01-12 PROCEDURE — 80053 COMPREHEN METABOLIC PANEL: CPT | Performed by: NURSE PRACTITIONER

## 2023-01-12 PROCEDURE — 84443 ASSAY THYROID STIM HORMONE: CPT | Performed by: NURSE PRACTITIONER

## 2023-01-12 PROCEDURE — 90677 PCV20 VACCINE IM: CPT | Performed by: NURSE PRACTITIONER

## 2023-01-12 PROCEDURE — 1159F MED LIST DOCD IN RCRD: CPT | Performed by: NURSE PRACTITIONER

## 2023-01-12 PROCEDURE — G0103 PSA SCREENING: HCPCS

## 2023-01-12 PROCEDURE — 83036 HEMOGLOBIN GLYCOSYLATED A1C: CPT

## 2023-01-12 NOTE — PROGRESS NOTES
The ABCs of the Annual Wellness Visit  Subsequent Medicare Wellness Visit    Chief Complaint   Patient presents with   • Medicare Wellness-subsequent       Subjective   History of Present Illness:  Kelvin French is a 77 y.o. male who presents for a Subsequent Medicare Wellness Visit.    HEALTH RISK ASSESSMENT    Recent Hospitalizations:  No hospitalization(s) within the last year.    Current Medical Providers:  Patient Care Team:  Joshua Ahuja APRN as PCP - General (Family Medicine)    Smoking Status:  Social History     Tobacco Use   Smoking Status Former   Smokeless Tobacco Never   Tobacco Comments    quit in the 1980's       Alcohol Consumption:  Social History     Substance and Sexual Activity   Alcohol Use Not Currently    Comment: quit 5 years ago       Depression Screen:   PHQ-2/PHQ-9 Depression Screening 1/12/2023   Retired PHQ-9 Total Score -   Retired Total Score -   Little Interest or Pleasure in Doing Things 0-->not at all   Feeling Down, Depressed or Hopeless 0-->not at all   Trouble Falling or Staying Asleep, or Sleeping Too Much -   Feeling Tired or Having Little Energy -   Poor Appetite or Overeating -   Feeling Bad about Yourself - or that You are a Failure or Have Let Yourself or Your Family Down -   Trouble Concentrating on Things, Such as Reading the Newspaper or Watching Television -   Moving or Speaking So Slowly that Other People Could Have Noticed? Or the Opposite - Being So Fidgety -   Thoughts that You Would be Better Off Dead or of Hurting Yourself in Some Way -   PHQ-9: Brief Depression Severity Measure Score 0   If You Checked Off Any Problems, How Difficult Have These Problems Made It For You to Do Your Work, Take Care of Things at Home, or Get Along with Other People? -       Fall Risk Screen:  STEADI Fall Risk Assessment was completed, and patient is at LOW risk for falls.Assessment completed on:1/12/2023    Health Habits and Functional and Cognitive Screening:  Functional &  Cognitive Status 1/12/2023   Do you have difficulty preparing food and eating? No   Do you have difficulty bathing yourself, getting dressed or grooming yourself? No   Do you have difficulty using the toilet? No   Do you have difficulty moving around from place to place? No   Do you have trouble with steps or getting out of a bed or a chair? No   Current Diet Well Balanced Diet   Dental Exam Up to date   Eye Exam Up to date   Exercise (times per week) 0 times per week   Current Exercises Include No Regular Exercise   Do you need help using the phone?  No   Are you deaf or do you have serious difficulty hearing?  No   Do you need help with transportation? No   Do you need help shopping? No   Do you need help preparing meals?  No   Do you need help with housework?  No   Do you need help with laundry? No   Do you need help taking your medications? No   Do you need help managing money? No   Do you ever drive or ride in a car without wearing a seat belt? No   Have you felt unusual stress, anger or loneliness in the last month? No   Who do you live with? Alone   If you need help, do you have trouble finding someone available to you? No   Have you been bothered in the last four weeks by sexual problems? No   Do you have difficulty concentrating, remembering or making decisions? No         Does the patient have evidence of cognitive impairment? No    Asprin use counseling:Taking ASA appropriately as indicated    Age-appropriate Screening Schedule:  Refer to the list below for future screening recommendations based on patient's age, sex and/or medical conditions. Orders for these recommended tests are listed in the plan section. The patient has been provided with a written plan.    Health Maintenance   Topic Date Due   • ZOSTER VACCINE (1 of 2) Never done   • DIABETIC EYE EXAM  05/01/2022   • LIPID PANEL  06/24/2022   • URINE MICROALBUMIN  07/09/2022   • HEMOGLOBIN A1C  02/08/2023   • DIABETIC FOOT EXAM  01/12/2024   •  TDAP/TD VACCINES (2 - Td or Tdap) 07/09/2031   • INFLUENZA VACCINE  Completed          The following portions of the patient's history were reviewed and updated as appropriate: allergies, current medications, past family history, past medical history, past social history, past surgical history and problem list.    Outpatient Medications Prior to Visit   Medication Sig Dispense Refill   • amLODIPine (NORVASC) 10 MG tablet Take 1 tablet by mouth Daily. 90 tablet 1   • aspirin 81 MG EC tablet Take 1 tablet by mouth Daily. 90 tablet 3   • atorvastatin (LIPITOR) 80 MG tablet Take 1 tablet by mouth Every Night. 90 tablet 1   • Blood Pressure Monitoring (Adult Blood Pressure Cuff Lg) kit 1 each Daily. 1 each 0   • carvedilol (COREG) 25 MG tablet Take 1 tablet by mouth Every 12 (Twelve) Hours. 180 tablet 1   • cloNIDine (CATAPRES) 0.1 MG tablet Take one tablet by mouth twice daily 180 tablet 1   • clopidogrel (PLAVIX) 75 MG tablet Take 1 tablet by mouth Daily. 90 tablet 1   • glucose blood (Glucose Meter Test) test strip Check blood sugars 3 times daily as needed. 100 each 5   • glucose monitor monitoring kit 1 each As Needed (Check blood sugars 3 times daily as needed.). 1 each 0   • Lancets misc Check blood sugars 3 times daily as needed. 100 each 5   • losartan (COZAAR) 25 MG tablet Take 1 tablet by mouth Daily. 90 tablet 1   • metFORMIN ER (GLUCOPHAGE-XR) 500 MG 24 hr tablet Take 1 tablet by mouth Daily With Breakfast. 90 tablet 1     No facility-administered medications prior to visit.       Patient Active Problem List   Diagnosis   • Elevated serum creatinine   • New onset type 2 diabetes mellitus (HCC)   • Cerebral atherosclerosis       Advanced Care Planning:  ACP discussion was declined by the patient. Patient does not have an advance directive, declines further assistance.    Review of Systems   Constitutional: Negative for activity change, appetite change and fatigue.   HENT: Negative for congestion.    Eyes:  "Positive for blurred vision.   Respiratory: Negative for cough and shortness of breath.    Cardiovascular: Negative for chest pain and leg swelling.   Gastrointestinal: Negative for abdominal pain.   Musculoskeletal: Positive for arthralgias.   Neurological: Negative for dizziness, weakness and confusion.   Psychiatric/Behavioral: Negative for behavioral problems and decreased concentration.       Compared to one year ago, the patient feels his physical health is better.  Compared to one year ago, the patient feels his mental health is the same.    Reviewed chart for potential of high risk medication in the elderly: yes  Reviewed chart for potential of harmful drug interactions in the elderly:yes    Objective         Vitals:    01/12/23 1337   BP: 120/78   BP Location: Left arm   Patient Position: Sitting   Pulse: 87   Temp: 97.1 °F (36.2 °C)   SpO2: 99%   Weight: 78.9 kg (174 lb)   Height: 175.3 cm (69\")       Body mass index is 25.7 kg/m².  Discussed the patient's BMI with him. The BMI is above average; BMI management plan is completed.    Physical Exam  Vitals and nursing note reviewed.   Constitutional:       Appearance: Normal appearance.   HENT:      Head: Normocephalic.   Eyes:      Extraocular Movements: Extraocular movements intact.      Conjunctiva/sclera: Conjunctivae normal.      Pupils: Pupils are equal, round, and reactive to light.      Comments: Wearing dark glasses, poor vision   Neck:      Thyroid: No thyromegaly.      Vascular: No carotid bruit.   Cardiovascular:      Rate and Rhythm: Normal rate and regular rhythm.      Pulses: Normal pulses.      Heart sounds: Normal heart sounds.   Pulmonary:      Effort: Pulmonary effort is normal.      Breath sounds: Normal breath sounds.   Abdominal:      General: Bowel sounds are normal. There is no distension.      Palpations: Abdomen is soft.      Tenderness: There is no abdominal tenderness.   Lymphadenopathy:      Cervical: No cervical adenopathy. "   Skin:     General: Skin is warm and dry.      Capillary Refill: Capillary refill takes less than 2 seconds.   Neurological:      General: No focal deficit present.      Mental Status: He is alert and oriented to person, place, and time.      Gait: Gait is intact.   Psychiatric:         Attention and Perception: Attention normal.         Mood and Affect: Mood normal.         Behavior: Behavior normal.         Thought Content: Thought content normal.         Judgment: Judgment normal.               Assessment & Plan   Medicare Risks and Personalized Health Plan  CMS Preventative Services Quick Reference  Advance Directive Discussion  Cardiovascular risk  Fall Risk  Glaucoma Risk  Immunizations Discussed/Encouraged (specific immunizations; Prevnar 20 (Pneumococcal 20-valent conjugate) and Shingrix )  Prostate Cancer Screening     The above risks/problems have been discussed with the patient.  Pertinent information has been shared with the patient in the After Visit Summary.  Follow up plans and orders are seen below in the Assessment/Plan Section.    Diagnoses and all orders for this visit:    1. Medicare annual wellness visit, subsequent (Primary)  -     CBC w AUTO Differential; Future  -     Comprehensive Metabolic Panel  -     TSH Rfx On Abnormal To Free T4    2. Encounter for immunization  -     Pneumococcal Conjugate Vaccine 20-Valent (PCV20)    3. Type 2 diabetes mellitus without complication, without long-term current use of insulin (HCC)  -     MicroAlbumin, Urine, Random - Urine, Clean Catch; Future  -     Hemoglobin A1c; Future    4. Screening for AAA (abdominal aortic aneurysm)  -     [place] aaa screen limited; Future    5. Legally blind    6. Screening for prostate cancer  -     PSA Screen; Future    7. Cerebral atherosclerosis    8. Dyslipidemia    9. Essential hypertension    10. Hemoglobin A1c less than 7.0%    11. Chronic anticoagulation      Eye exam done within the past 12 months  Schedule AAA  screen  Foot exam done  Cont Metformin for DM  BP stable on Losartan and Amlodipine  Cont statin for lipids & atherosclerosis  Schedule AAA screen  Cont Plavix  Pt does not wish to have any further colon ca screen      Follow Up:  Return in about 6 months (around 7/12/2023) for Diabetes.     An After Visit Summary and PPPS were given to the patient.

## 2023-01-13 LAB
ALBUMIN SERPL-MCNC: 4.7 G/DL (ref 3.5–5.2)
ALBUMIN/GLOB SERPL: 1.7 G/DL
ALP SERPL-CCNC: 62 U/L (ref 39–117)
ALT SERPL W P-5'-P-CCNC: 13 U/L (ref 1–41)
ANION GAP SERPL CALCULATED.3IONS-SCNC: 11 MMOL/L (ref 5–15)
AST SERPL-CCNC: 18 U/L (ref 1–40)
BILIRUB SERPL-MCNC: 0.3 MG/DL (ref 0–1.2)
BUN SERPL-MCNC: 22 MG/DL (ref 8–23)
BUN/CREAT SERPL: 14.7 (ref 7–25)
CALCIUM SPEC-SCNC: 10.1 MG/DL (ref 8.6–10.5)
CHLORIDE SERPL-SCNC: 98 MMOL/L (ref 98–107)
CO2 SERPL-SCNC: 29 MMOL/L (ref 22–29)
CREAT SERPL-MCNC: 1.5 MG/DL (ref 0.76–1.27)
EGFRCR SERPLBLD CKD-EPI 2021: 48.2 ML/MIN/1.73
GLOBULIN UR ELPH-MCNC: 2.8 GM/DL
GLUCOSE SERPL-MCNC: 101 MG/DL (ref 65–99)
POTASSIUM SERPL-SCNC: 4.3 MMOL/L (ref 3.5–5.2)
PROT SERPL-MCNC: 7.5 G/DL (ref 6–8.5)
PSA SERPL-MCNC: 7.01 NG/ML (ref 0–4)
SODIUM SERPL-SCNC: 138 MMOL/L (ref 136–145)
TSH SERPL DL<=0.05 MIU/L-ACNC: 1.77 UIU/ML (ref 0.27–4.2)

## 2023-03-02 DIAGNOSIS — E78.5 DYSLIPIDEMIA: ICD-10-CM

## 2023-03-02 DIAGNOSIS — I10 ESSENTIAL HYPERTENSION: ICD-10-CM

## 2023-03-02 DIAGNOSIS — E11.9 TYPE 2 DIABETES MELLITUS WITHOUT COMPLICATION, WITHOUT LONG-TERM CURRENT USE OF INSULIN: ICD-10-CM

## 2023-03-02 RX ORDER — CARVEDILOL 25 MG/1
TABLET ORAL
Qty: 180 TABLET | Refills: 1 | Status: SHIPPED | OUTPATIENT
Start: 2023-03-02

## 2023-03-02 RX ORDER — CLONIDINE HYDROCHLORIDE 0.1 MG/1
TABLET ORAL
Qty: 180 TABLET | Refills: 1 | Status: SHIPPED | OUTPATIENT
Start: 2023-03-02

## 2023-03-02 RX ORDER — LOSARTAN POTASSIUM 25 MG/1
25 TABLET ORAL DAILY
Qty: 90 TABLET | Refills: 1 | Status: SHIPPED | OUTPATIENT
Start: 2023-03-02

## 2023-03-02 RX ORDER — AMLODIPINE BESYLATE 10 MG/1
10 TABLET ORAL
Qty: 90 TABLET | Refills: 1 | Status: SHIPPED | OUTPATIENT
Start: 2023-03-02

## 2023-03-02 RX ORDER — METFORMIN HYDROCHLORIDE 500 MG/1
500 TABLET, EXTENDED RELEASE ORAL
Qty: 90 TABLET | Refills: 1 | Status: SHIPPED | OUTPATIENT
Start: 2023-03-02

## 2023-03-02 RX ORDER — ATORVASTATIN CALCIUM 80 MG/1
80 TABLET, FILM COATED ORAL NIGHTLY
Qty: 90 TABLET | Refills: 1 | Status: SHIPPED | OUTPATIENT
Start: 2023-03-02

## 2023-08-31 DIAGNOSIS — E78.5 DYSLIPIDEMIA: ICD-10-CM

## 2023-08-31 DIAGNOSIS — I10 ESSENTIAL HYPERTENSION: ICD-10-CM

## 2023-08-31 DIAGNOSIS — E11.9 TYPE 2 DIABETES MELLITUS WITHOUT COMPLICATION, WITHOUT LONG-TERM CURRENT USE OF INSULIN: ICD-10-CM

## 2023-08-31 RX ORDER — CLONIDINE HYDROCHLORIDE 0.1 MG/1
TABLET ORAL
Qty: 180 TABLET | Refills: 0 | Status: SHIPPED | OUTPATIENT
Start: 2023-08-31

## 2023-08-31 RX ORDER — CARVEDILOL 25 MG/1
TABLET ORAL
Qty: 180 TABLET | Refills: 0 | Status: SHIPPED | OUTPATIENT
Start: 2023-08-31

## 2023-08-31 RX ORDER — LOSARTAN POTASSIUM 25 MG/1
TABLET ORAL
Qty: 90 TABLET | Refills: 0 | Status: SHIPPED | OUTPATIENT
Start: 2023-08-31

## 2023-08-31 RX ORDER — ASPIRIN 81 MG/1
TABLET, COATED ORAL
Qty: 90 TABLET | Refills: 0 | Status: SHIPPED | OUTPATIENT
Start: 2023-08-31

## 2023-08-31 RX ORDER — METFORMIN HYDROCHLORIDE 500 MG/1
500 TABLET, EXTENDED RELEASE ORAL
Qty: 90 TABLET | Refills: 0 | Status: SHIPPED | OUTPATIENT
Start: 2023-08-31

## 2023-08-31 RX ORDER — ATORVASTATIN CALCIUM 80 MG/1
80 TABLET, FILM COATED ORAL NIGHTLY
Qty: 90 TABLET | Refills: 0 | Status: SHIPPED | OUTPATIENT
Start: 2023-08-31

## 2023-08-31 RX ORDER — AMLODIPINE BESYLATE 10 MG/1
10 TABLET ORAL
Qty: 90 TABLET | Refills: 0 | Status: SHIPPED | OUTPATIENT
Start: 2023-08-31

## 2023-09-27 ENCOUNTER — OFFICE VISIT (OUTPATIENT)
Dept: INTERNAL MEDICINE | Facility: CLINIC | Age: 78
End: 2023-09-27
Payer: MEDICARE

## 2023-09-27 VITALS
HEIGHT: 69 IN | SYSTOLIC BLOOD PRESSURE: 140 MMHG | DIASTOLIC BLOOD PRESSURE: 80 MMHG | TEMPERATURE: 97.9 F | WEIGHT: 185.8 LBS | OXYGEN SATURATION: 98 % | HEART RATE: 66 BPM | BODY MASS INDEX: 27.52 KG/M2

## 2023-09-27 DIAGNOSIS — F43.9 STRESS: ICD-10-CM

## 2023-09-27 DIAGNOSIS — E78.5 DYSLIPIDEMIA: ICD-10-CM

## 2023-09-27 DIAGNOSIS — N18.31 TYPE 2 DIABETES MELLITUS WITH STAGE 3A CHRONIC KIDNEY DISEASE, WITHOUT LONG-TERM CURRENT USE OF INSULIN: Primary | ICD-10-CM

## 2023-09-27 DIAGNOSIS — E11.22 TYPE 2 DIABETES MELLITUS WITH STAGE 3A CHRONIC KIDNEY DISEASE, WITHOUT LONG-TERM CURRENT USE OF INSULIN: Primary | ICD-10-CM

## 2023-09-27 DIAGNOSIS — I10 ESSENTIAL HYPERTENSION: ICD-10-CM

## 2023-09-27 LAB
EXPIRATION DATE: NORMAL
HBA1C MFR BLD: 6.7 %
Lab: NORMAL

## 2023-09-27 NOTE — PROGRESS NOTES
"Chief Complaint   Patient presents with    Diabetes    Follow-up       HPI  Kelvin French is a 78 y.o. male presents for follow-up on diabetes.  He states he's doing well.  Has a lot of stress.  He states that social security reported him dead so SS took money out of his account and left him in the hole.  This happened a few months ago.    The following portions of the patient's history were reviewed and updated as appropriate: allergies, current medications, past family history, past medical history, past social history, past surgical history, and problem list.    Subjective  Review of Systems   Constitutional:  Negative for activity change, appetite change and fatigue.   HENT:  Negative for congestion.    Respiratory:  Negative for cough and shortness of breath.    Cardiovascular:  Negative for chest pain and leg swelling.   Gastrointestinal:  Negative for abdominal pain.   Neurological:  Negative for dizziness, weakness and confusion.   Psychiatric/Behavioral:  Positive for stress. Negative for behavioral problems and decreased concentration.      Objective  Visit Vitals  /80 (BP Location: Left arm, Patient Position: Sitting)   Pulse 66   Temp 97.9 °F (36.6 °C)   Ht 175.3 cm (69\")   Wt 84.3 kg (185 lb 12.8 oz)   SpO2 98%   BMI 27.44 kg/m²        Physical Exam  Vitals and nursing note reviewed.   Constitutional:       Comments: Wearing dark glasses   HENT:      Head: Normocephalic.   Eyes:      Pupils: Pupils are equal, round, and reactive to light.   Cardiovascular:      Rate and Rhythm: Normal rate and regular rhythm.      Pulses: Normal pulses.      Heart sounds: Normal heart sounds.   Pulmonary:      Effort: Pulmonary effort is normal.      Breath sounds: Normal breath sounds.   Skin:     General: Skin is warm and dry.      Capillary Refill: Capillary refill takes less than 2 seconds.   Neurological:      General: No focal deficit present.      Mental Status: He is alert and oriented to person, place, " and time.      Gait: Gait is intact.   Psychiatric:         Attention and Perception: Attention normal.         Mood and Affect: Mood normal.         Behavior: Behavior normal.        Procedures     Assessment and Plan  Diagnoses and all orders for this visit:    1. Type 2 diabetes mellitus with stage 3a chronic kidney disease, without long-term current use of insulin (Primary)  -     POC Glycosylated Hemoglobin (Hb A1C)    2. Essential hypertension    3. Dyslipidemia    4. Stress    A1c 6.7, cont Metformin  BP mildly elevated, pt upset and stressed, didn't sleep good last night, will cont to monitor  Cont statin  Will cont to monitor kidney function    Return in about 6 months (around 3/27/2024) for Diabetes.        Joshua Ahuja, APRN

## 2023-12-04 DIAGNOSIS — I10 ESSENTIAL HYPERTENSION: ICD-10-CM

## 2023-12-04 DIAGNOSIS — E11.9 TYPE 2 DIABETES MELLITUS WITHOUT COMPLICATION, WITHOUT LONG-TERM CURRENT USE OF INSULIN: ICD-10-CM

## 2023-12-04 DIAGNOSIS — E78.5 DYSLIPIDEMIA: ICD-10-CM

## 2023-12-04 RX ORDER — LOSARTAN POTASSIUM 25 MG/1
TABLET ORAL
Qty: 90 TABLET | Refills: 0 | Status: SHIPPED | OUTPATIENT
Start: 2023-12-04

## 2023-12-04 RX ORDER — ATORVASTATIN CALCIUM 80 MG/1
80 TABLET, FILM COATED ORAL NIGHTLY
Qty: 90 TABLET | Refills: 0 | Status: SHIPPED | OUTPATIENT
Start: 2023-12-04

## 2023-12-04 RX ORDER — ASPIRIN 81 MG/1
TABLET, COATED ORAL
Qty: 90 TABLET | Refills: 0 | Status: SHIPPED | OUTPATIENT
Start: 2023-12-04

## 2023-12-04 RX ORDER — METFORMIN HYDROCHLORIDE 500 MG/1
500 TABLET, EXTENDED RELEASE ORAL
Qty: 90 TABLET | Refills: 0 | Status: SHIPPED | OUTPATIENT
Start: 2023-12-04

## 2023-12-04 RX ORDER — CARVEDILOL 25 MG/1
TABLET ORAL
Qty: 180 TABLET | Refills: 0 | Status: SHIPPED | OUTPATIENT
Start: 2023-12-04

## 2023-12-04 RX ORDER — AMLODIPINE BESYLATE 10 MG/1
10 TABLET ORAL
Qty: 90 TABLET | Refills: 0 | Status: SHIPPED | OUTPATIENT
Start: 2023-12-04

## 2023-12-04 RX ORDER — CLONIDINE HYDROCHLORIDE 0.1 MG/1
TABLET ORAL
Qty: 180 TABLET | Refills: 0 | Status: SHIPPED | OUTPATIENT
Start: 2023-12-04

## 2024-02-28 DIAGNOSIS — E11.9 TYPE 2 DIABETES MELLITUS WITHOUT COMPLICATION, WITHOUT LONG-TERM CURRENT USE OF INSULIN: ICD-10-CM

## 2024-02-28 DIAGNOSIS — I10 ESSENTIAL HYPERTENSION: ICD-10-CM

## 2024-02-28 DIAGNOSIS — E78.5 DYSLIPIDEMIA: ICD-10-CM

## 2024-02-28 RX ORDER — METFORMIN HYDROCHLORIDE 500 MG/1
500 TABLET, EXTENDED RELEASE ORAL
Qty: 90 TABLET | Refills: 2 | Status: SHIPPED | OUTPATIENT
Start: 2024-02-28

## 2024-02-28 RX ORDER — AMLODIPINE BESYLATE 10 MG/1
10 TABLET ORAL
Qty: 90 TABLET | Refills: 2 | Status: SHIPPED | OUTPATIENT
Start: 2024-02-28

## 2024-02-28 RX ORDER — ATORVASTATIN CALCIUM 80 MG/1
80 TABLET, FILM COATED ORAL NIGHTLY
Qty: 90 TABLET | Refills: 2 | Status: SHIPPED | OUTPATIENT
Start: 2024-02-28

## 2024-02-28 RX ORDER — CARVEDILOL 25 MG/1
TABLET ORAL
Qty: 180 TABLET | Refills: 2 | Status: SHIPPED | OUTPATIENT
Start: 2024-02-28

## 2024-02-28 RX ORDER — ASPIRIN 81 MG/1
TABLET, COATED ORAL
Qty: 90 TABLET | Refills: 0 | Status: SHIPPED | OUTPATIENT
Start: 2024-02-28

## 2024-02-28 RX ORDER — CLONIDINE HYDROCHLORIDE 0.1 MG/1
TABLET ORAL
Qty: 180 TABLET | Refills: 2 | Status: SHIPPED | OUTPATIENT
Start: 2024-02-28

## 2024-02-28 RX ORDER — LOSARTAN POTASSIUM 25 MG/1
TABLET ORAL
Qty: 90 TABLET | Refills: 2 | Status: SHIPPED | OUTPATIENT
Start: 2024-02-28

## 2024-03-08 ENCOUNTER — LAB (OUTPATIENT)
Dept: INTERNAL MEDICINE | Facility: CLINIC | Age: 79
End: 2024-03-08
Payer: MEDICARE

## 2024-03-08 ENCOUNTER — OFFICE VISIT (OUTPATIENT)
Dept: INTERNAL MEDICINE | Facility: CLINIC | Age: 79
End: 2024-03-08
Payer: MEDICARE

## 2024-03-08 VITALS
WEIGHT: 193.6 LBS | SYSTOLIC BLOOD PRESSURE: 146 MMHG | TEMPERATURE: 98 F | BODY MASS INDEX: 28.68 KG/M2 | HEART RATE: 87 BPM | DIASTOLIC BLOOD PRESSURE: 78 MMHG | OXYGEN SATURATION: 99 % | HEIGHT: 69 IN

## 2024-03-08 DIAGNOSIS — E11.9 NEW ONSET TYPE 2 DIABETES MELLITUS: Primary | ICD-10-CM

## 2024-03-08 DIAGNOSIS — Z00.00 MEDICARE ANNUAL WELLNESS VISIT, SUBSEQUENT: Primary | ICD-10-CM

## 2024-03-08 DIAGNOSIS — Z79.01 CHRONIC ANTICOAGULATION: ICD-10-CM

## 2024-03-08 DIAGNOSIS — E11.22 TYPE 2 DIABETES MELLITUS WITH STAGE 3A CHRONIC KIDNEY DISEASE, WITHOUT LONG-TERM CURRENT USE OF INSULIN: ICD-10-CM

## 2024-03-08 DIAGNOSIS — H54.40 BLINDNESS OF LEFT EYE WITH LOW VISION IN CONTRALATERAL EYE: ICD-10-CM

## 2024-03-08 DIAGNOSIS — N18.31 TYPE 2 DIABETES MELLITUS WITH STAGE 3A CHRONIC KIDNEY DISEASE, WITHOUT LONG-TERM CURRENT USE OF INSULIN: ICD-10-CM

## 2024-03-08 DIAGNOSIS — I10 ESSENTIAL HYPERTENSION: ICD-10-CM

## 2024-03-08 DIAGNOSIS — H54.50 BLINDNESS OF LEFT EYE WITH LOW VISION IN CONTRALATERAL EYE: ICD-10-CM

## 2024-03-08 DIAGNOSIS — Z23 ENCOUNTER FOR IMMUNIZATION: ICD-10-CM

## 2024-03-08 DIAGNOSIS — E78.5 DYSLIPIDEMIA: ICD-10-CM

## 2024-03-08 PROBLEM — H54.10 BLINDNESS OF LEFT EYE WITH LOW VISION IN CONTRALATERAL EYE: Status: ACTIVE | Noted: 2024-03-08

## 2024-03-08 LAB
ALBUMIN SERPL-MCNC: 4.4 G/DL (ref 3.5–5.2)
ALBUMIN UR-MCNC: <1.2 MG/DL
ALBUMIN/GLOB SERPL: 1.5 G/DL
ALP SERPL-CCNC: 60 U/L (ref 39–117)
ALT SERPL W P-5'-P-CCNC: 21 U/L (ref 1–41)
ANION GAP SERPL CALCULATED.3IONS-SCNC: 11.5 MMOL/L (ref 5–15)
AST SERPL-CCNC: 21 U/L (ref 1–40)
BASOPHILS # BLD AUTO: 0.04 10*3/MM3 (ref 0–0.2)
BASOPHILS NFR BLD AUTO: 0.8 % (ref 0–1.5)
BILIRUB SERPL-MCNC: 0.3 MG/DL (ref 0–1.2)
BUN SERPL-MCNC: 16 MG/DL (ref 8–23)
BUN/CREAT SERPL: 12.6 (ref 7–25)
CALCIUM SPEC-SCNC: 9.8 MG/DL (ref 8.6–10.5)
CHLORIDE SERPL-SCNC: 102 MMOL/L (ref 98–107)
CHOLEST SERPL-MCNC: 102 MG/DL (ref 0–200)
CO2 SERPL-SCNC: 24.5 MMOL/L (ref 22–29)
CREAT SERPL-MCNC: 1.27 MG/DL (ref 0.76–1.27)
CREAT UR-MCNC: 73 MG/DL
DEPRECATED RDW RBC AUTO: 42.5 FL (ref 37–54)
EGFRCR SERPLBLD CKD-EPI 2021: 58.6 ML/MIN/1.73
EOSINOPHIL # BLD AUTO: 0.31 10*3/MM3 (ref 0–0.4)
EOSINOPHIL NFR BLD AUTO: 6.2 % (ref 0.3–6.2)
ERYTHROCYTE [DISTWIDTH] IN BLOOD BY AUTOMATED COUNT: 13.6 % (ref 12.3–15.4)
EXPIRATION DATE: ABNORMAL
GLOBULIN UR ELPH-MCNC: 2.9 GM/DL
GLUCOSE SERPL-MCNC: 113 MG/DL (ref 65–99)
HBA1C MFR BLD: 6.7 % (ref 4.5–5.7)
HCT VFR BLD AUTO: 38.4 % (ref 37.5–51)
HDLC SERPL-MCNC: 40 MG/DL (ref 40–60)
HGB BLD-MCNC: 12.4 G/DL (ref 13–17.7)
IMM GRANULOCYTES # BLD AUTO: 0.01 10*3/MM3 (ref 0–0.05)
IMM GRANULOCYTES NFR BLD AUTO: 0.2 % (ref 0–0.5)
LDLC SERPL CALC-MCNC: 51 MG/DL (ref 0–100)
LDLC/HDLC SERPL: 1.33 {RATIO}
LYMPHOCYTES # BLD AUTO: 2.37 10*3/MM3 (ref 0.7–3.1)
LYMPHOCYTES NFR BLD AUTO: 47.6 % (ref 19.6–45.3)
Lab: ABNORMAL
MCH RBC QN AUTO: 27.4 PG (ref 26.6–33)
MCHC RBC AUTO-ENTMCNC: 32.3 G/DL (ref 31.5–35.7)
MCV RBC AUTO: 85 FL (ref 79–97)
MICROALBUMIN/CREAT UR: NORMAL MG/G{CREAT}
MONOCYTES # BLD AUTO: 0.42 10*3/MM3 (ref 0.1–0.9)
MONOCYTES NFR BLD AUTO: 8.4 % (ref 5–12)
NEUTROPHILS NFR BLD AUTO: 1.83 10*3/MM3 (ref 1.7–7)
NEUTROPHILS NFR BLD AUTO: 36.8 % (ref 42.7–76)
NRBC BLD AUTO-RTO: 0 /100 WBC (ref 0–0.2)
PLATELET # BLD AUTO: 185 10*3/MM3 (ref 140–450)
PMV BLD AUTO: 10.6 FL (ref 6–12)
POTASSIUM SERPL-SCNC: 4.4 MMOL/L (ref 3.5–5.2)
PROT SERPL-MCNC: 7.3 G/DL (ref 6–8.5)
RBC # BLD AUTO: 4.52 10*6/MM3 (ref 4.14–5.8)
SODIUM SERPL-SCNC: 138 MMOL/L (ref 136–145)
TRIGL SERPL-MCNC: 45 MG/DL (ref 0–150)
TSH SERPL DL<=0.05 MIU/L-ACNC: 1.4 UIU/ML (ref 0.27–4.2)
VLDLC SERPL-MCNC: 11 MG/DL (ref 5–40)
WBC NRBC COR # BLD AUTO: 4.98 10*3/MM3 (ref 3.4–10.8)

## 2024-03-08 PROCEDURE — 85025 COMPLETE CBC W/AUTO DIFF WBC: CPT | Performed by: NURSE PRACTITIONER

## 2024-03-08 PROCEDURE — 82043 UR ALBUMIN QUANTITATIVE: CPT | Performed by: NURSE PRACTITIONER

## 2024-03-08 PROCEDURE — 80061 LIPID PANEL: CPT | Performed by: NURSE PRACTITIONER

## 2024-03-08 PROCEDURE — 80053 COMPREHEN METABOLIC PANEL: CPT | Performed by: NURSE PRACTITIONER

## 2024-03-08 PROCEDURE — 84443 ASSAY THYROID STIM HORMONE: CPT | Performed by: NURSE PRACTITIONER

## 2024-03-08 PROCEDURE — 82570 ASSAY OF URINE CREATININE: CPT | Performed by: NURSE PRACTITIONER

## 2024-03-08 NOTE — PROGRESS NOTES
The ABCs of the Annual Wellness Visit  Subsequent Medicare Wellness Visit    Chief Complaint   Patient presents with    Medicare Wellness-subsequent       Subjective   History of Present Illness:  Kelvin French is a 78 y.o. male who presents for a Subsequent Medicare Wellness Visit. He lives home alone. He cooks and cleans for himself. Brother brought him to clinic today.     HEALTH RISK ASSESSMENT    Recent Hospitalizations:  No hospitalization(s) within the last year.    Current Medical Providers:  Patient Care Team:  Joshua Ahuja APRN as PCP - General (Family Medicine)    Smoking Status:  Social History     Tobacco Use   Smoking Status Former   Smokeless Tobacco Never   Tobacco Comments    quit in the 1980's       Alcohol Consumption:  Social History     Substance and Sexual Activity   Alcohol Use Not Currently    Comment: quit 5 years ago       Depression Screen:       3/8/2024     1:00 PM   PHQ-2/PHQ-9 Depression Screening   Little Interest or Pleasure in Doing Things 0-->not at all   Feeling Down, Depressed or Hopeless 0-->not at all   Trouble Falling or Staying Asleep, or Sleeping Too Much 0-->not at all   Feeling Tired or Having Little Energy 0-->not at all   Poor Appetite or Overeating 0-->not at all   Feeling Bad about Yourself - or that You are a Failure or Have Let Yourself or Your Family Down 0-->not at all   Trouble Concentrating on Things, Such as Reading the Newspaper or Watching Television 0-->not at all   Moving or Speaking So Slowly that Other People Could Have Noticed? Or the Opposite - Being So Fidgety 0-->not at all   Thoughts that You Would be Better Off Dead or of Hurting Yourself in Some Way 0-->not at all   PHQ-9: Brief Depression Severity Measure Score 0       Fall Risk Screen:  WOLF Fall Risk Assessment was completed, and patient is at LOW risk for falls.Assessment completed on:3/8/2024    Health Habits and Functional and Cognitive Screening:      3/8/2024     1:00 PM    Functional & Cognitive Status   Do you have difficulty preparing food and eating? No   Do you have difficulty bathing yourself, getting dressed or grooming yourself? No   Do you have difficulty using the toilet? No   Do you have trouble with steps or getting out of a bed or a chair? No   Current Diet Well Balanced Diet   Dental Exam Not up to date   Eye Exam Up to date   Exercise (times per week) 3 times per week   Current Exercises Include Other   Do you need help using the phone?  No   Are you deaf or do you have serious difficulty hearing?  No   Do you need help to go to places out of walking distance? Yes   Do you need help shopping? Yes   Do you need help preparing meals?  No   Do you need help with housework?  No   Do you need help with laundry? No   Do you need help taking your medications? No   Do you need help managing money? No   Do you ever drive or ride in a car without wearing a seat belt? No   Have you felt unusual stress, anger or loneliness in the last month? No   Who do you live with? Alone   If you need help, do you have trouble finding someone available to you? No   Have you been bothered in the last four weeks by sexual problems? No   Do you have difficulty concentrating, remembering or making decisions? No         Does the patient have evidence of cognitive impairment? No    Asprin use counseling:Taking ASA appropriately as indicated    Age-appropriate Screening Schedule:  Refer to the list below for future screening recommendations based on patient's age, sex and/or medical conditions. Orders for these recommended tests are listed in the plan section. The patient has been provided with a written plan.    Health Maintenance   Topic Date Due    RSV Vaccine - Adults (1 - 1-dose 60+ series) Never done    LIPID PANEL  06/24/2022    URINE MICROALBUMIN  01/12/2024    HEMOGLOBIN A1C  03/27/2024    DIABETIC EYE EXAM  11/01/2024    ANNUAL WELLNESS VISIT  03/08/2025    BMI FOLLOWUP  03/08/2025     TDAP/TD VACCINES (2 - Td or Tdap) 07/09/2031    HEPATITIS C SCREENING  Completed    COVID-19 Vaccine  Completed    INFLUENZA VACCINE  Completed    Pneumococcal Vaccine 65+  Completed    ZOSTER VACCINE  Discontinued    COLORECTAL CANCER SCREENING  Discontinued          The following portions of the patient's history were reviewed and updated as appropriate: allergies, current medications, past family history, past medical history, past social history, past surgical history, and problem list.    Outpatient Medications Prior to Visit   Medication Sig Dispense Refill    amLODIPine (NORVASC) 10 MG tablet TAKE 1 TABLET BY MOUTH DAILY. 90 tablet 2    Aspirin Low Dose 81 MG EC tablet TAKE 1 TABLET BY MOUTH DAILY. 90 tablet 0    atorvastatin (LIPITOR) 80 MG tablet TAKE 1 TABLET BY MOUTH EVERY NIGHT. 90 tablet 2    Blood Pressure Monitoring (Adult Blood Pressure Cuff Lg) kit 1 each Daily. 1 each 0    carvedilol (COREG) 25 MG tablet TAKE 1 TABLET BY MOUTH EVERY 12 HOURS AS DIRECTED 180 tablet 2    cloNIDine (CATAPRES) 0.1 MG tablet TAKE 1 TABLET BY MOUTH 2 TIMES PER  tablet 2    clopidogrel (PLAVIX) 75 MG tablet Take 1 tablet by mouth Daily. 90 tablet 1    glucose blood (Glucose Meter Test) test strip Check blood sugars 3 times daily as needed. 100 each 5    glucose monitor monitoring kit 1 each As Needed (Check blood sugars 3 times daily as needed.). 1 each 0    Lancets misc Check blood sugars 3 times daily as needed. 100 each 5    losartan (COZAAR) 25 MG tablet TAKE 1 TABLET BY MOUTH ONCE DAILY 90 tablet 2    metFORMIN ER (GLUCOPHAGE-XR) 500 MG 24 hr tablet TAKE 1 TABLET BY MOUTH DAILY WITH BREAKFAST. 90 tablet 2     No facility-administered medications prior to visit.       Patient Active Problem List   Diagnosis    Elevated serum creatinine    Type 2 diabetes mellitus with stage 3a chronic kidney disease, without long-term current use of insulin    Cerebral atherosclerosis    Essential hypertension    Dyslipidemia  "   Blindness of left eye with low vision in contralateral eye       Advanced Care Planning:  ACP discussion was declined by the patient. Patient does not have an advance directive, declines further assistance.    Review of Systems   Constitutional:  Negative for activity change, appetite change and fatigue.   HENT:  Negative for congestion.    Eyes:         100% blind in left eye, 98% blind in right eye   Respiratory:  Negative for cough and shortness of breath.    Cardiovascular:  Negative for chest pain and leg swelling.   Gastrointestinal:  Negative for abdominal pain.   Neurological:  Negative for dizziness, weakness, headache, memory problem and confusion.   Psychiatric/Behavioral:  Negative for behavioral problems and decreased concentration.        Compared to one year ago, the patient feels his physical health is better.  Compared to one year ago, the patient feels his mental health is the same.    Reviewed chart for potential of high risk medication in the elderly: yes  Reviewed chart for potential of harmful drug interactions in the elderly:yes    Objective         Vitals:    03/08/24 1300   BP: 146/78   BP Location: Left arm   Patient Position: Sitting   Pulse: 87   Temp: 98 °F (36.7 °C)   SpO2: 99%   Weight: 87.8 kg (193 lb 9.6 oz)   Height: 175.3 cm (69\")       Body mass index is 28.59 kg/m².  Discussed the patient's BMI with him. The BMI is above average; BMI management plan is completed.    Physical Exam  Vitals and nursing note reviewed.   Constitutional:       General: He is not in acute distress.     Appearance: He is not ill-appearing or toxic-appearing.   HENT:      Head: Normocephalic.      Right Ear: Tympanic membrane normal.      Left Ear: Tympanic membrane normal.      Nose: Nose normal.   Eyes:      Pupils: Pupils are equal, round, and reactive to light.      Comments: Wearing solar shield glasses    Neck:      Thyroid: No thyroid mass, thyromegaly or thyroid tenderness.   Cardiovascular:    "   Rate and Rhythm: Normal rate and regular rhythm.      Pulses: Normal pulses.      Heart sounds: Normal heart sounds.   Pulmonary:      Effort: Pulmonary effort is normal.      Breath sounds: Normal breath sounds.   Lymphadenopathy:      Cervical: No cervical adenopathy.   Skin:     General: Skin is warm and dry.      Capillary Refill: Capillary refill takes less than 2 seconds.   Neurological:      General: No focal deficit present.      Mental Status: He is alert and oriented to person, place, and time.      Gait: Gait is intact.   Psychiatric:         Attention and Perception: Attention normal.         Mood and Affect: Mood normal.         Behavior: Behavior normal.       Results for orders placed or performed in visit on 03/08/24   POC Glycosylated Hemoglobin (Hb A1C)    Specimen: Blood   Result Value Ref Range    Hemoglobin A1C 6.7 (A) 4.5 - 5.7 %    Lot Number [id number]     Expiration Date 11/12/2025             Assessment & Plan   Medicare Risks and Personalized Health Plan  CMS Preventative Services Quick Reference  Advance Directive Discussion  Cardiovascular risk  Dementia/Memory   Diabetic Lab Screening   Glaucoma Risk  Immunizations Discussed/Encouraged (specific immunizations; RSV (Respiratory Syncytial Virus) )  Obesity/Overweight     The above risks/problems have been discussed with the patient.  Pertinent information has been shared with the patient in the After Visit Summary.  Follow up plans and orders are seen below in the Assessment/Plan Section.          Diagnoses and all orders for this visit:    1. Medicare annual wellness visit, subsequent (Primary)    2. Type 2 diabetes mellitus with stage 3a chronic kidney disease, without long-term current use of insulin  -     POC Glycosylated Hemoglobin (Hb A1C)  -     Microalbumin / Creatinine Urine Ratio - Urine, Clean Catch; Future  -     Microalbumin / Creatinine Urine Ratio - Urine, Clean Catch  -     CBC & Differential  -     Comprehensive  Metabolic Panel  -     Lipid Panel  -     TSH Rfx On Abnormal To Free T4    3. Blindness of left eye with low vision in contralateral eye    4. Dyslipidemia    5. Essential hypertension    6. Encounter for immunization  -     RSVPreF3 Vac Recomb Adjuvanted (AREXVY) 120 MCG/0.5ML reconstituted suspension injection; Inject 0.5 mL into the appropriate muscle as directed by prescriber 1 (One) Time for 1 dose.  Dispense: 0.5 mL; Refill: 0    7. Chronic anticoagulation      DM well controlled on Metformin  Eye exam UTD  BP mildly elevated, will cont to monitor  Cont statin  Check labs  Encouraged RSV vaccine    Follow Up:  Return in about 6 months (around 9/8/2024) for Diabetes.     An After Visit Summary and PPPS were given to the patient.

## 2024-09-03 DIAGNOSIS — E11.9 TYPE 2 DIABETES MELLITUS WITHOUT COMPLICATION, WITHOUT LONG-TERM CURRENT USE OF INSULIN: ICD-10-CM

## 2024-09-03 RX ORDER — ASPIRIN 81 MG/1
TABLET, COATED ORAL
Qty: 90 TABLET | Refills: 0 | Status: SHIPPED | OUTPATIENT
Start: 2024-09-03

## 2024-10-04 ENCOUNTER — OFFICE VISIT (OUTPATIENT)
Dept: INTERNAL MEDICINE | Facility: CLINIC | Age: 79
End: 2024-10-04
Payer: MEDICARE

## 2024-10-04 VITALS
SYSTOLIC BLOOD PRESSURE: 144 MMHG | HEIGHT: 69 IN | WEIGHT: 191 LBS | HEART RATE: 78 BPM | TEMPERATURE: 98 F | BODY MASS INDEX: 28.29 KG/M2 | DIASTOLIC BLOOD PRESSURE: 80 MMHG | OXYGEN SATURATION: 99 %

## 2024-10-04 DIAGNOSIS — Z29.11 NEED FOR RSV IMMUNIZATION: ICD-10-CM

## 2024-10-04 DIAGNOSIS — I10 ESSENTIAL HYPERTENSION: ICD-10-CM

## 2024-10-04 DIAGNOSIS — K02.9 DENTAL CARIES: ICD-10-CM

## 2024-10-04 DIAGNOSIS — Z79.01 CHRONIC ANTICOAGULATION: ICD-10-CM

## 2024-10-04 DIAGNOSIS — H40.1133 PRIMARY OPEN ANGLE GLAUCOMA (POAG) OF BOTH EYES, SEVERE STAGE: ICD-10-CM

## 2024-10-04 DIAGNOSIS — Z23 NEED FOR INFLUENZA VACCINATION: ICD-10-CM

## 2024-10-04 DIAGNOSIS — R01.1 HEART MURMUR: ICD-10-CM

## 2024-10-04 DIAGNOSIS — H54.10 BLINDNESS OF LEFT EYE WITH LOW VISION IN CONTRALATERAL EYE: ICD-10-CM

## 2024-10-04 DIAGNOSIS — N18.31 TYPE 2 DIABETES MELLITUS WITH STAGE 3A CHRONIC KIDNEY DISEASE, WITHOUT LONG-TERM CURRENT USE OF INSULIN: Primary | ICD-10-CM

## 2024-10-04 DIAGNOSIS — E11.22 TYPE 2 DIABETES MELLITUS WITH STAGE 3A CHRONIC KIDNEY DISEASE, WITHOUT LONG-TERM CURRENT USE OF INSULIN: Primary | ICD-10-CM

## 2024-10-04 LAB
EXPIRATION DATE: ABNORMAL
HBA1C MFR BLD: 6.8 % (ref 4.5–5.7)
Lab: ABNORMAL

## 2024-10-04 NOTE — PROGRESS NOTES
"Chief Complaint   Patient presents with    Follow-up    Diabetes       HPI  Kelvin French is a 79 y.o. male presents for follow-up on diabetes.  His last A1c was 6.7.  He states that he thinks that he's going to have to get several teeth pulled because his teeth are black due to cavities.  Otherwise, he is doing well.  He lives alone.  His brother transports him to his appointments.      The following portions of the patient's history were reviewed and updated as appropriate: allergies, current medications, past family history, past medical history, past social history, past surgical history, and problem list.    Subjective  Review of Systems   Constitutional:  Negative for activity change, appetite change and fatigue.   HENT:  Negative for congestion.    Eyes:  Positive for blurred vision.   Respiratory:  Negative for cough and shortness of breath.    Cardiovascular:  Negative for chest pain and leg swelling.   Gastrointestinal:  Negative for abdominal pain.   Musculoskeletal:  Positive for arthralgias.   Neurological:  Negative for dizziness, weakness and confusion.   Psychiatric/Behavioral:  Negative for behavioral problems and decreased concentration.        Objective  Visit Vitals  /80 (BP Location: Left arm, Patient Position: Sitting)   Pulse 78   Temp 98 °F (36.7 °C)   Ht 175.3 cm (69\")   Wt 86.6 kg (191 lb)   SpO2 99%   BMI 28.21 kg/m²        Physical Exam  Vitals and nursing note reviewed.   HENT:      Head: Normocephalic.      Mouth/Throat:      Dentition: Dental caries present.   Eyes:      Pupils: Pupils are equal, round, and reactive to light.      Comments: Poor vision, wearing dark glasses   Cardiovascular:      Rate and Rhythm: Normal rate and regular rhythm.      Pulses: Normal pulses.      Heart sounds: Murmur (Gr3) heard.   Pulmonary:      Effort: Pulmonary effort is normal. No respiratory distress.      Breath sounds: Normal breath sounds.   Skin:     General: Skin is warm and dry.      " Capillary Refill: Capillary refill takes less than 2 seconds.   Neurological:      General: No focal deficit present.      Mental Status: He is alert and oriented to person, place, and time.      Gait: Gait is intact.   Psychiatric:         Attention and Perception: Attention normal.         Mood and Affect: Mood normal.         Behavior: Behavior normal.          Procedures          Results for orders placed or performed in visit on 10/04/24   POC Glycosylated Hemoglobin (Hb A1C)    Specimen: Blood   Result Value Ref Range    Hemoglobin A1C 6.8 (A) 4.5 - 5.7 %    Lot Number [id number]     Expiration Date 04/04/2026         Assessment and Plan  Diagnoses and all orders for this visit:    1. Type 2 diabetes mellitus with stage 3a chronic kidney disease, without long-term current use of insulin (Primary)  -     POC Glycosylated Hemoglobin (Hb A1C)    2. Need for influenza vaccination  -     Fluzone High-Dose 65+yrs ([id number])    3. Essential hypertension    4. Blindness of left eye with low vision in contralateral eye    5. Chronic anticoagulation    6. Dental caries    7. Need for RSV immunization  -     RSVPreF3 Vac Recomb Adjuvanted (AREXVY) 120 MCG/0.5ML reconstituted suspension injection; Inject 0.5 mL into the appropriate muscle as directed by prescriber 1 (One) Time for 1 dose.  Dispense: 0.5 mL; Refill: 0    8. Heart murmur  -     Adult Transthoracic Echo Complete W/ Cont if Necessary Per Protocol; Future    9. Primary open angle glaucoma (POAG) of both eyes, severe stage      DM well controlled pm Glucophage  BP elevated, hasn't had BP med yet - he states he will take when he gets home  Cont statin/ASA  Order ECHO due to new heart murmur  Order RSV vaccine  Keep future appt with eye specialist      Return in about 6 months (around 4/4/2025) for Medicare Wellness.        RACHEL Boucher

## 2024-11-05 ENCOUNTER — HOSPITAL ENCOUNTER (OUTPATIENT)
Dept: CARDIOLOGY | Facility: HOSPITAL | Age: 79
Discharge: HOME | End: 2024-11-05
Admitting: NURSE PRACTITIONER
Payer: MEDICARE

## 2024-11-05 VITALS — WEIGHT: 191 LBS | BODY MASS INDEX: 28.29 KG/M2 | HEIGHT: 69 IN

## 2024-11-05 DIAGNOSIS — R01.1 HEART MURMUR: ICD-10-CM

## 2024-11-05 LAB
BH CV ECHO LEFT VENTRICLE GLOBAL LONGITUDINAL STRAIN: -19.6 %
BH CV ECHO MEAS - AO MAX PG: 23.6 MMHG
BH CV ECHO MEAS - AO MEAN PG: 13 MMHG
BH CV ECHO MEAS - AO ROOT DIAM: 3.8 CM
BH CV ECHO MEAS - AO V2 MAX: 242.8 CM/SEC
BH CV ECHO MEAS - AO V2 VTI: 53.4 CM
BH CV ECHO MEAS - AVA(I,D): 1.37 CM2
BH CV ECHO MEAS - EDV(CUBED): 74.1 ML
BH CV ECHO MEAS - EDV(MOD-SP2): 98.8 ML
BH CV ECHO MEAS - EDV(MOD-SP4): 116 ML
BH CV ECHO MEAS - EF(MOD-BP): 70.2 %
BH CV ECHO MEAS - EF(MOD-SP2): 74.4 %
BH CV ECHO MEAS - EF(MOD-SP4): 64.3 %
BH CV ECHO MEAS - ESV(CUBED): 13.8 ML
BH CV ECHO MEAS - ESV(MOD-SP2): 25.3 ML
BH CV ECHO MEAS - ESV(MOD-SP4): 41.4 ML
BH CV ECHO MEAS - FS: 42.9 %
BH CV ECHO MEAS - IVS/LVPW: 1 CM
BH CV ECHO MEAS - IVSD: 1.4 CM
BH CV ECHO MEAS - LA DIMENSION: 4 CM
BH CV ECHO MEAS - LAT PEAK E' VEL: 9 CM/SEC
BH CV ECHO MEAS - LV DIASTOLIC VOL/BSA (35-75): 57.3 CM2
BH CV ECHO MEAS - LV MASS(C)D: 224.3 GRAMS
BH CV ECHO MEAS - LV MAX PG: 4 MMHG
BH CV ECHO MEAS - LV MEAN PG: 2 MMHG
BH CV ECHO MEAS - LV SYSTOLIC VOL/BSA (12-30): 20.4 CM2
BH CV ECHO MEAS - LV V1 MAX: 99.6 CM/SEC
BH CV ECHO MEAS - LV V1 VTI: 23.3 CM
BH CV ECHO MEAS - LVIDD: 4.2 CM
BH CV ECHO MEAS - LVIDS: 2.4 CM
BH CV ECHO MEAS - LVOT AREA: 3.1 CM2
BH CV ECHO MEAS - LVOT DIAM: 2 CM
BH CV ECHO MEAS - LVPWD: 1.4 CM
BH CV ECHO MEAS - MED PEAK E' VEL: 6.4 CM/SEC
BH CV ECHO MEAS - MV A MAX VEL: 78.1 CM/SEC
BH CV ECHO MEAS - MV DEC SLOPE: 358 CM/SEC2
BH CV ECHO MEAS - MV DEC TIME: 0.18 SEC
BH CV ECHO MEAS - MV E MAX VEL: 87.5 CM/SEC
BH CV ECHO MEAS - MV E/A: 1.12
BH CV ECHO MEAS - MV MAX PG: 3.4 MMHG
BH CV ECHO MEAS - MV MEAN PG: 2 MMHG
BH CV ECHO MEAS - MV P1/2T: 80.1 MSEC
BH CV ECHO MEAS - MV V2 VTI: 25.8 CM
BH CV ECHO MEAS - MVA(P1/2T): 2.7 CM2
BH CV ECHO MEAS - MVA(VTI): 2.8 CM2
BH CV ECHO MEAS - PA ACC TIME: 0.18 SEC
BH CV ECHO MEAS - RAP SYSTOLE: 8 MMHG
BH CV ECHO MEAS - RVSP: 30 MMHG
BH CV ECHO MEAS - SV(LVOT): 73.2 ML
BH CV ECHO MEAS - SV(MOD-SP2): 73.5 ML
BH CV ECHO MEAS - SV(MOD-SP4): 74.6 ML
BH CV ECHO MEAS - SVI(LVOT): 36.1 ML/M2
BH CV ECHO MEAS - SVI(MOD-SP2): 36.3 ML/M2
BH CV ECHO MEAS - SVI(MOD-SP4): 36.8 ML/M2
BH CV ECHO MEAS - TAPSE (>1.6): 1.27 CM
BH CV ECHO MEAS - TR MAX PG: 22.2 MMHG
BH CV ECHO MEAS - TR MAX VEL: 235.3 CM/SEC
BH CV ECHO MEASUREMENTS AVERAGE E/E' RATIO: 11.36
BH CV XLRA - RV BASE: 2.6 CM
BH CV XLRA - RV LENGTH: 8.2 CM
BH CV XLRA - RV MID: 2.7 CM
BH CV XLRA - TDI S': 15.7 CM/SEC
LEFT ATRIUM VOLUME INDEX: 37.6 ML/M2

## 2024-11-05 PROCEDURE — 93306 TTE W/DOPPLER COMPLETE: CPT | Performed by: INTERNAL MEDICINE

## 2024-11-05 PROCEDURE — 93306 TTE W/DOPPLER COMPLETE: CPT

## 2024-11-05 PROCEDURE — 93356 MYOCRD STRAIN IMG SPCKL TRCK: CPT

## 2024-11-05 PROCEDURE — 93356 MYOCRD STRAIN IMG SPCKL TRCK: CPT | Performed by: INTERNAL MEDICINE

## 2024-12-02 DIAGNOSIS — E78.5 DYSLIPIDEMIA: ICD-10-CM

## 2024-12-02 DIAGNOSIS — E11.9 TYPE 2 DIABETES MELLITUS WITHOUT COMPLICATION, WITHOUT LONG-TERM CURRENT USE OF INSULIN: ICD-10-CM

## 2024-12-02 DIAGNOSIS — I10 ESSENTIAL HYPERTENSION: ICD-10-CM

## 2024-12-02 RX ORDER — METFORMIN HYDROCHLORIDE 500 MG/1
500 TABLET, EXTENDED RELEASE ORAL
Qty: 90 TABLET | Refills: 2 | Status: SHIPPED | OUTPATIENT
Start: 2024-12-02

## 2024-12-02 RX ORDER — CLONIDINE HYDROCHLORIDE 0.1 MG/1
TABLET ORAL
Qty: 180 TABLET | Refills: 2 | Status: SHIPPED | OUTPATIENT
Start: 2024-12-02

## 2024-12-02 RX ORDER — AMLODIPINE BESYLATE 10 MG/1
10 TABLET ORAL
Qty: 90 TABLET | Refills: 2 | Status: SHIPPED | OUTPATIENT
Start: 2024-12-02

## 2024-12-02 RX ORDER — ASPIRIN 81 MG/1
TABLET, COATED ORAL
Qty: 90 TABLET | Refills: 2 | Status: SHIPPED | OUTPATIENT
Start: 2024-12-02

## 2024-12-02 RX ORDER — ATORVASTATIN CALCIUM 80 MG/1
80 TABLET, FILM COATED ORAL NIGHTLY
Qty: 90 TABLET | Refills: 2 | Status: SHIPPED | OUTPATIENT
Start: 2024-12-02

## 2024-12-02 RX ORDER — CARVEDILOL 25 MG/1
TABLET ORAL
Qty: 180 TABLET | Refills: 2 | Status: SHIPPED | OUTPATIENT
Start: 2024-12-02

## 2024-12-02 RX ORDER — LOSARTAN POTASSIUM 25 MG/1
TABLET ORAL
Qty: 90 TABLET | Refills: 2 | Status: SHIPPED | OUTPATIENT
Start: 2024-12-02

## 2025-04-04 ENCOUNTER — LAB (OUTPATIENT)
Dept: INTERNAL MEDICINE | Facility: CLINIC | Age: 80
End: 2025-04-04
Payer: MEDICARE

## 2025-04-04 ENCOUNTER — OFFICE VISIT (OUTPATIENT)
Dept: INTERNAL MEDICINE | Facility: CLINIC | Age: 80
End: 2025-04-04
Payer: MEDICARE

## 2025-04-04 VITALS
OXYGEN SATURATION: 98 % | DIASTOLIC BLOOD PRESSURE: 64 MMHG | TEMPERATURE: 97.3 F | BODY MASS INDEX: 28.14 KG/M2 | SYSTOLIC BLOOD PRESSURE: 120 MMHG | HEART RATE: 60 BPM | HEIGHT: 69 IN | WEIGHT: 190 LBS

## 2025-04-04 DIAGNOSIS — Z00.00 MEDICARE ANNUAL WELLNESS VISIT, SUBSEQUENT: Primary | ICD-10-CM

## 2025-04-04 DIAGNOSIS — E11.22 TYPE 2 DIABETES MELLITUS WITH STAGE 3A CHRONIC KIDNEY DISEASE, WITHOUT LONG-TERM CURRENT USE OF INSULIN: ICD-10-CM

## 2025-04-04 DIAGNOSIS — H40.1133 PRIMARY OPEN ANGLE GLAUCOMA (POAG) OF BOTH EYES, SEVERE STAGE: ICD-10-CM

## 2025-04-04 DIAGNOSIS — N18.31 TYPE 2 DIABETES MELLITUS WITH STAGE 3A CHRONIC KIDNEY DISEASE, WITHOUT LONG-TERM CURRENT USE OF INSULIN: ICD-10-CM

## 2025-04-04 DIAGNOSIS — M20.42 HAMMER TOE OF SECOND TOE OF LEFT FOOT: ICD-10-CM

## 2025-04-04 DIAGNOSIS — I10 ESSENTIAL HYPERTENSION: ICD-10-CM

## 2025-04-04 DIAGNOSIS — I35.0 MILD AORTIC VALVE STENOSIS: ICD-10-CM

## 2025-04-04 DIAGNOSIS — Z79.01 CHRONIC ANTICOAGULATION: ICD-10-CM

## 2025-04-04 DIAGNOSIS — Z29.11 NEED FOR RSV IMMUNIZATION: ICD-10-CM

## 2025-04-04 DIAGNOSIS — I67.2 CEREBRAL ATHEROSCLEROSIS: ICD-10-CM

## 2025-04-04 DIAGNOSIS — R01.1 HEART MURMUR: ICD-10-CM

## 2025-04-04 DIAGNOSIS — E78.5 DYSLIPIDEMIA: ICD-10-CM

## 2025-04-04 DIAGNOSIS — H54.10 BLINDNESS OF LEFT EYE WITH LOW VISION IN CONTRALATERAL EYE: ICD-10-CM

## 2025-04-04 LAB
BASOPHILS # BLD MANUAL: 0.04 10*3/MM3 (ref 0–0.2)
BASOPHILS NFR BLD MANUAL: 1.1 % (ref 0–1.5)
BURR CELLS BLD QL SMEAR: ABNORMAL
DEPRECATED RDW RBC AUTO: 43.5 FL (ref 37–54)
EOSINOPHIL # BLD MANUAL: 0.04 10*3/MM3 (ref 0–0.4)
EOSINOPHIL NFR BLD MANUAL: 1.1 % (ref 0.3–6.2)
ERYTHROCYTE [DISTWIDTH] IN BLOOD BY AUTOMATED COUNT: 14.1 % (ref 12.3–15.4)
HBA1C MFR BLD: 7 % (ref 4.8–5.6)
HCT VFR BLD AUTO: 37.9 % (ref 37.5–51)
HGB BLD-MCNC: 11.8 G/DL (ref 13–17.7)
LYMPHOCYTES # BLD MANUAL: 2.06 10*3/MM3 (ref 0.7–3.1)
LYMPHOCYTES NFR BLD MANUAL: 8 % (ref 5–12)
MCH RBC QN AUTO: 26.5 PG (ref 26.6–33)
MCHC RBC AUTO-ENTMCNC: 31.1 G/DL (ref 31.5–35.7)
MCV RBC AUTO: 85 FL (ref 79–97)
MONOCYTES # BLD: 0.26 10*3/MM3 (ref 0.1–0.9)
NEUTROPHILS # BLD AUTO: 0.9 10*3/MM3 (ref 1.7–7)
NEUTROPHILS NFR BLD MANUAL: 27.3 % (ref 42.7–76)
NRBC BLD AUTO-RTO: 0 /100 WBC (ref 0–0.2)
OVALOCYTES BLD QL SMEAR: ABNORMAL
PLAT MORPH BLD: NORMAL
PLATELET # BLD AUTO: 181 10*3/MM3 (ref 140–450)
PMV BLD AUTO: 10.1 FL (ref 6–12)
POIKILOCYTOSIS BLD QL SMEAR: ABNORMAL
RBC # BLD AUTO: 4.46 10*6/MM3 (ref 4.14–5.8)
SMUDGE CELLS BLD QL SMEAR: ABNORMAL
VARIANT LYMPHS NFR BLD MANUAL: 62.5 % (ref 19.6–45.3)
WBC NRBC COR # BLD AUTO: 3.3 10*3/MM3 (ref 3.4–10.8)

## 2025-04-04 PROCEDURE — 85025 COMPLETE CBC W/AUTO DIFF WBC: CPT | Performed by: NURSE PRACTITIONER

## 2025-04-04 PROCEDURE — 82043 UR ALBUMIN QUANTITATIVE: CPT | Performed by: NURSE PRACTITIONER

## 2025-04-04 PROCEDURE — 80053 COMPREHEN METABOLIC PANEL: CPT | Performed by: NURSE PRACTITIONER

## 2025-04-04 PROCEDURE — 36415 COLL VENOUS BLD VENIPUNCTURE: CPT | Performed by: NURSE PRACTITIONER

## 2025-04-04 PROCEDURE — 84443 ASSAY THYROID STIM HORMONE: CPT | Performed by: NURSE PRACTITIONER

## 2025-04-04 PROCEDURE — 85007 BL SMEAR W/DIFF WBC COUNT: CPT | Performed by: NURSE PRACTITIONER

## 2025-04-04 PROCEDURE — 83036 HEMOGLOBIN GLYCOSYLATED A1C: CPT | Performed by: NURSE PRACTITIONER

## 2025-04-04 PROCEDURE — 80061 LIPID PANEL: CPT | Performed by: NURSE PRACTITIONER

## 2025-04-04 PROCEDURE — 82570 ASSAY OF URINE CREATININE: CPT | Performed by: NURSE PRACTITIONER

## 2025-04-04 NOTE — PROGRESS NOTES
Subjective   The ABCs of the Annual Wellness Visit  Medicare Wellness Visit      Kelvin French is a 79 y.o. patient who presents for a Medicare Wellness Visit.    The following portions of the patient's history were reviewed and   updated as appropriate: allergies, current medications, past family history, past medical history, past social history, past surgical history, and problem list.    Compared to one year ago, the patient's physical   health is the same.  Compared to one year ago, the patient's mental   health is the same.    Recent Hospitalizations:  He was not admitted to the hospital during the last year.     Current Medical Providers:  Patient Care Team:  Joshua Ahuja APRN as PCP - General (Family Medicine)    Outpatient Medications Prior to Visit   Medication Sig Dispense Refill    amLODIPine (NORVASC) 10 MG tablet TAKE 1 TABLET BY MOUTH DAILY. 90 tablet 2    Aspirin Low Dose 81 MG EC tablet TAKE 1 TABLET BY MOUTH DAILY. 90 tablet 2    atorvastatin (LIPITOR) 80 MG tablet TAKE 1 TABLET BY MOUTH EVERY NIGHT. 90 tablet 2    Blood Pressure Monitoring (Adult Blood Pressure Cuff Lg) kit 1 each Daily. 1 each 0    carvedilol (COREG) 25 MG tablet TAKE 1 TABLET BY MOUTH EVERY 12 HOURS AS DIRECTED 180 tablet 2    cloNIDine (CATAPRES) 0.1 MG tablet TAKE 1 TABLET BY MOUTH 2 TIMES PER  tablet 2    clopidogrel (PLAVIX) 75 MG tablet Take 1 tablet by mouth Daily. 90 tablet 1    glucose blood (Glucose Meter Test) test strip Check blood sugars 3 times daily as needed. 100 each 5    glucose monitor monitoring kit 1 each As Needed (Check blood sugars 3 times daily as needed.). 1 each 0    Lancets misc Check blood sugars 3 times daily as needed. 100 each 5    losartan (COZAAR) 25 MG tablet TAKE 1 TABLET BY MOUTH ONCE DAILY 90 tablet 2    metFORMIN ER (GLUCOPHAGE-XR) 500 MG 24 hr tablet TAKE 1 TABLET BY MOUTH DAILY WITH BREAKFAST. 90 tablet 2     No facility-administered medications prior to visit.     No  "opioid medication identified on active medication list. I have reviewed chart for other potential  high risk medication/s and harmful drug interactions in the elderly.      Aspirin is on active medication list. Aspirin use is indicated based on review of current medical condition/s. Pros and cons of this therapy have been discussed today. Benefits of this medication outweigh potential harm.  Patient has been encouraged to continue taking this medication.  .      Patient Active Problem List   Diagnosis    Elevated serum creatinine    Type 2 diabetes mellitus without complication, without long-term current use of insulin    Cerebral atherosclerosis    Essential hypertension    Dyslipidemia    Blindness of left eye with low vision in contralateral eye    Chronic anticoagulation     Advance Care Planning Advance Directive is not on file.  ACP discussion was held with the patient during this visit. Patient does not have an advance directive, information provided.            Objective   Vitals:    04/04/25 1347   BP: 120/64   BP Location: Left arm   Patient Position: Sitting   Pulse: 60   Temp: 97.3 °F (36.3 °C)   SpO2: 98%   Weight: 86.2 kg (190 lb)   Height: 175.3 cm (69\")   PainSc: 0-No pain       Estimated body mass index is 28.06 kg/m² as calculated from the following:    Height as of this encounter: 175.3 cm (69\").    Weight as of this encounter: 86.2 kg (190 lb).    BMI is >= 25 and <30. (Overweight) The following options were offered after discussion;: exercise counseling/recommendations and nutrition counseling/recommendations           Does the patient have evidence of cognitive impairment? No                                                                                               Health  Risk Assessment    Smoking Status:  Social History     Tobacco Use   Smoking Status Former   Smokeless Tobacco Never   Tobacco Comments    quit in the 1980's     Alcohol Consumption:  Social History     Substance and " Sexual Activity   Alcohol Use Not Currently    Comment: quit 5 years ago       Fall Risk Screen  STEADI Fall Risk Assessment was completed, and patient is at LOW risk for falls.Assessment completed on:2025    Depression Screening   Little interest or pleasure in doing things? Not at all   Feeling down, depressed, or hopeless? Not at all   PHQ-2 Total Score 0   Trouble falling or staying asleep, or sleeping too much? Not at all   Feeling tired or having little energy? Not at all   Poor appetite or overeating? Not at all   Feeling bad about yourself - or that you are a failure or have let yourself or your family down? Not at all   Trouble concentrating on things, such as reading the newspaper or watching television? Not at all   Moving or speaking so slowly that other people could have noticed? Or the opposite - being so fidgety or restless that you have been moving around a lot more than usual? Not at all     Thoughts that you would be better off dead, or of hurting yourself in some way? Not at all   PHQ-9 Total Score 0   If you checked off any problems, how difficult have these problems made it for you to do your work, take care of things at home, or get along with other people? Not difficult at all        Health Habits and Functional and Cognitive Screenin/4/2025     1:00 PM   Functional & Cognitive Status   Do you have difficulty preparing food and eating? No   Do you have difficulty bathing yourself, getting dressed or grooming yourself? No   Do you have difficulty using the toilet? No   Do you have difficulty moving around from place to place? No   Do you have trouble with steps or getting out of a bed or a chair? No   Current Diet Unhealthy Diet   Dental Exam Up to date   Exercise (times per week) 0 times per week   Current Exercises Include No Regular Exercise   Do you need help using the phone?  No   Are you deaf or do you have serious difficulty hearing?  No   Do you need help to go to places  out of walking distance? No   Do you need help shopping? No   Do you need help preparing meals?  No   Do you need help with housework?  No   Do you need help with laundry? No   Do you need help taking your medications? No   Do you need help managing money? No   Do you ever drive or ride in a car without wearing a seat belt? No   Have you felt unusual stress, anger or loneliness in the last month? No   Who do you live with? Alone   If you need help, do you have trouble finding someone available to you? No   Do you have difficulty concentrating, remembering or making decisions? No           Age-appropriate Screening Schedule:  Refer to the list below for future screening recommendations based on patient's age, sex and/or medical conditions. Orders for these recommended tests are listed in the plan section. The patient has been provided with a written plan.    Health Maintenance List  Health Maintenance   Topic Date Due    URINE MICROALBUMIN-CREATININE RATIO (uACR)  Never done    RSV Vaccine - Adults (1 - 1-dose 75+ series) Never done    COVID-19 Vaccine (6 - 2024-25 season) 09/01/2024    DIABETIC EYE EXAM  11/01/2024    HEMOGLOBIN A1C  04/04/2025    INFLUENZA VACCINE  07/01/2025    ANNUAL WELLNESS VISIT  04/04/2026    DIABETIC FOOT EXAM  04/04/2026    TDAP/TD VACCINES (2 - Td or Tdap) 07/09/2031    HEPATITIS C SCREENING  Completed    Pneumococcal Vaccine 50+  Completed    ZOSTER VACCINE  Discontinued    COLORECTAL CANCER SCREENING  Discontinued                                                                                                                                                CMS Preventative Services Quick Reference  Risk Factors Identified During Encounter  Fall Risk-High or Moderate: Discussed Fall Prevention in the home  Glaucoma or Family History of Glaucoma:   keep eye appt on May 5th  Immunizations Discussed/Encouraged: RSV (Respiratory Syncytial Virus)    The above risks/problems have been discussed  with the patient.  Pertinent information has been shared with the patient in the After Visit Summary.  An After Visit Summary and PPPS were made available to the patient.    Follow Up:   Next Medicare Wellness visit to be scheduled in 1 year.     Assessment & Plan  Medicare annual wellness visit, subsequent    Orders:    CBC & Differential    Comprehensive Metabolic Panel    Lipid Panel    TSH Rfx On Abnormal To Free T4    Hemoglobin A1c    Type 2 diabetes mellitus with stage 3a chronic kidney disease, without long-term current use of insulin  Diabetes is stable.   Continue current treatment regimen.  Diabetes will be reassessed in 6 months    Orders:    Microalbumin / Creatinine Urine Ratio - Urine, Clean Catch    Essential hypertension           Dyslipidemia         Blindness of left eye with low vision in contralateral eye         Primary open angle glaucoma (POAG) of both eyes, severe stage         Chronic anticoagulation         Heart murmur         Cerebral atherosclerosis         Need for RSV immunization    Orders:    RSVPreF3 Vac Recomb Adjuvanted (AREXVY) 120 MCG/0.5ML reconstituted suspension injection; Inject 0.5 mL into the appropriate muscle as directed by prescriber 1 (One) Time for 1 dose.    Mild aortic valve stenosis       Check labs today  Recommend RSV, order for Arexvy sent to pharmacy  Has eye appt in May  Foot exam done, left hammer toe (2nd digit), no lesions or sores, no signs of neuropathy  Cont Plavix and ASA due to atherosclerosis  Hammer toe of second toe of left foot              Follow Up:   Return in about 6 months (around 10/4/2025) for Diabetes.

## 2025-04-05 LAB
ALBUMIN SERPL-MCNC: 3.7 G/DL (ref 3.5–5.2)
ALBUMIN UR-MCNC: 2.7 MG/DL
ALBUMIN/GLOB SERPL: 1.1 G/DL
ALP SERPL-CCNC: 59 U/L (ref 39–117)
ALT SERPL W P-5'-P-CCNC: 11 U/L (ref 1–41)
ANION GAP SERPL CALCULATED.3IONS-SCNC: 9.7 MMOL/L (ref 5–15)
AST SERPL-CCNC: 22 U/L (ref 1–40)
BILIRUB SERPL-MCNC: 0.3 MG/DL (ref 0–1.2)
BUN SERPL-MCNC: 19 MG/DL (ref 8–23)
BUN/CREAT SERPL: 13.5 (ref 7–25)
CALCIUM SPEC-SCNC: 9.6 MG/DL (ref 8.6–10.5)
CHLORIDE SERPL-SCNC: 103 MMOL/L (ref 98–107)
CHOLEST SERPL-MCNC: 114 MG/DL (ref 0–200)
CO2 SERPL-SCNC: 25.3 MMOL/L (ref 22–29)
CREAT SERPL-MCNC: 1.41 MG/DL (ref 0.76–1.27)
CREAT UR-MCNC: 407.1 MG/DL
EGFRCR SERPLBLD CKD-EPI 2021: 51.3 ML/MIN/1.73
GLOBULIN UR ELPH-MCNC: 3.5 GM/DL
GLUCOSE SERPL-MCNC: 134 MG/DL (ref 65–99)
HDLC SERPL-MCNC: 28 MG/DL (ref 40–60)
LDLC SERPL CALC-MCNC: 72 MG/DL (ref 0–100)
LDLC/HDLC SERPL: 2.6 {RATIO}
MICROALBUMIN/CREAT UR: 6.6 MG/G (ref 0–29)
POTASSIUM SERPL-SCNC: 4.2 MMOL/L (ref 3.5–5.2)
PROT SERPL-MCNC: 7.2 G/DL (ref 6–8.5)
SODIUM SERPL-SCNC: 138 MMOL/L (ref 136–145)
TRIGL SERPL-MCNC: 66 MG/DL (ref 0–150)
TSH SERPL DL<=0.05 MIU/L-ACNC: 0.75 UIU/ML (ref 0.27–4.2)
VLDLC SERPL-MCNC: 14 MG/DL (ref 5–40)

## 2025-04-08 DIAGNOSIS — D64.9 ANEMIA, UNSPECIFIED TYPE: Primary | ICD-10-CM

## 2025-04-08 DIAGNOSIS — N18.31 TYPE 2 DIABETES MELLITUS WITH STAGE 3A CHRONIC KIDNEY DISEASE, WITHOUT LONG-TERM CURRENT USE OF INSULIN: Primary | ICD-10-CM

## 2025-04-08 DIAGNOSIS — R79.89 ABNORMAL CBC: ICD-10-CM

## 2025-04-08 DIAGNOSIS — E11.22 TYPE 2 DIABETES MELLITUS WITH STAGE 3A CHRONIC KIDNEY DISEASE, WITHOUT LONG-TERM CURRENT USE OF INSULIN: Primary | ICD-10-CM
